# Patient Record
Sex: FEMALE | Race: WHITE
[De-identification: names, ages, dates, MRNs, and addresses within clinical notes are randomized per-mention and may not be internally consistent; named-entity substitution may affect disease eponyms.]

---

## 2019-10-11 ENCOUNTER — HOSPITAL ENCOUNTER (INPATIENT)
Dept: HOSPITAL 61 - ER | Age: 54
LOS: 4 days | Discharge: HOME | DRG: 280 | End: 2019-10-15
Attending: INTERNAL MEDICINE | Admitting: INTERNAL MEDICINE
Payer: MEDICARE

## 2019-10-11 VITALS — BODY MASS INDEX: 40.58 KG/M2 | WEIGHT: 252.5 LBS | HEIGHT: 66 IN

## 2019-10-11 DIAGNOSIS — I27.81: ICD-10-CM

## 2019-10-11 DIAGNOSIS — F20.9: ICD-10-CM

## 2019-10-11 DIAGNOSIS — E66.01: ICD-10-CM

## 2019-10-11 DIAGNOSIS — E78.00: ICD-10-CM

## 2019-10-11 DIAGNOSIS — Z87.891: ICD-10-CM

## 2019-10-11 DIAGNOSIS — J96.01: ICD-10-CM

## 2019-10-11 DIAGNOSIS — Z88.8: ICD-10-CM

## 2019-10-11 DIAGNOSIS — I11.0: ICD-10-CM

## 2019-10-11 DIAGNOSIS — R32: ICD-10-CM

## 2019-10-11 DIAGNOSIS — I21.4: Primary | ICD-10-CM

## 2019-10-11 DIAGNOSIS — F41.9: ICD-10-CM

## 2019-10-11 DIAGNOSIS — G47.00: ICD-10-CM

## 2019-10-11 DIAGNOSIS — E03.9: ICD-10-CM

## 2019-10-11 DIAGNOSIS — F31.9: ICD-10-CM

## 2019-10-11 DIAGNOSIS — Z82.49: ICD-10-CM

## 2019-10-11 DIAGNOSIS — I50.43: ICD-10-CM

## 2019-10-11 LAB
BASOPHILS # BLD AUTO: 0.1 X10^3/UL (ref 0–0.2)
BASOPHILS NFR BLD: 1 % (ref 0–3)
EOSINOPHIL NFR BLD: 0 % (ref 0–3)
EOSINOPHIL NFR BLD: 0 X10^3/UL (ref 0–0.7)
ERYTHROCYTE [DISTWIDTH] IN BLOOD BY AUTOMATED COUNT: 14.7 % (ref 11.5–14.5)
HCT VFR BLD CALC: 40.1 % (ref 36–47)
HGB BLD-MCNC: 13.1 G/DL (ref 12–15.5)
LYMPHOCYTES # BLD: 1.5 X10^3/UL (ref 1–4.8)
LYMPHOCYTES NFR BLD AUTO: 7 % (ref 24–48)
MCH RBC QN AUTO: 29 PG (ref 25–35)
MCHC RBC AUTO-ENTMCNC: 33 G/DL (ref 31–37)
MCV RBC AUTO: 88 FL (ref 79–100)
MONO #: 2.3 X10^3/UL (ref 0–1.1)
MONOCYTES NFR BLD: 11 % (ref 0–9)
NEUT #: 17.4 X10^3/UL (ref 1.8–7.7)
NEUTROPHILS NFR BLD AUTO: 82 % (ref 31–73)
PLATELET # BLD AUTO: 175 X10^3/UL (ref 140–400)
RBC # BLD AUTO: 4.54 X10^6/UL (ref 3.5–5.4)
WBC # BLD AUTO: 21.4 X10^3/UL (ref 4–11)

## 2019-10-11 PROCEDURE — 83690 ASSAY OF LIPASE: CPT

## 2019-10-11 PROCEDURE — C1892 INTRO/SHEATH,FIXED,PEEL-AWAY: HCPCS

## 2019-10-11 PROCEDURE — 83880 ASSAY OF NATRIURETIC PEPTIDE: CPT

## 2019-10-11 PROCEDURE — 99152 MOD SED SAME PHYS/QHP 5/>YRS: CPT

## 2019-10-11 PROCEDURE — 85007 BL SMEAR W/DIFF WBC COUNT: CPT

## 2019-10-11 PROCEDURE — 90471 IMMUNIZATION ADMIN: CPT

## 2019-10-11 PROCEDURE — 84484 ASSAY OF TROPONIN QUANT: CPT

## 2019-10-11 PROCEDURE — 71045 X-RAY EXAM CHEST 1 VIEW: CPT

## 2019-10-11 PROCEDURE — G0378 HOSPITAL OBSERVATION PER HR: HCPCS

## 2019-10-11 PROCEDURE — C1769 GUIDE WIRE: HCPCS

## 2019-10-11 PROCEDURE — 80053 COMPREHEN METABOLIC PANEL: CPT

## 2019-10-11 PROCEDURE — 99153 MOD SED SAME PHYS/QHP EA: CPT

## 2019-10-11 PROCEDURE — 93005 ELECTROCARDIOGRAM TRACING: CPT

## 2019-10-11 PROCEDURE — 93461 R&L HRT ART/VENTRICLE ANGIO: CPT

## 2019-10-11 PROCEDURE — 83735 ASSAY OF MAGNESIUM: CPT

## 2019-10-11 PROCEDURE — 85025 COMPLETE CBC W/AUTO DIFF WBC: CPT

## 2019-10-11 PROCEDURE — 90686 IIV4 VACC NO PRSV 0.5 ML IM: CPT

## 2019-10-11 PROCEDURE — 85347 COAGULATION TIME ACTIVATED: CPT

## 2019-10-11 PROCEDURE — 94760 N-INVAS EAR/PLS OXIMETRY 1: CPT

## 2019-10-11 PROCEDURE — 93970 EXTREMITY STUDY: CPT

## 2019-10-11 PROCEDURE — 85520 HEPARIN ASSAY: CPT

## 2019-10-11 PROCEDURE — 93306 TTE W/DOPPLER COMPLETE: CPT

## 2019-10-11 PROCEDURE — 94640 AIRWAY INHALATION TREATMENT: CPT

## 2019-10-11 PROCEDURE — C1773 RET DEV, INSERTABLE: HCPCS

## 2019-10-11 PROCEDURE — 36415 COLL VENOUS BLD VENIPUNCTURE: CPT

## 2019-10-12 VITALS — SYSTOLIC BLOOD PRESSURE: 103 MMHG | DIASTOLIC BLOOD PRESSURE: 52 MMHG

## 2019-10-12 VITALS — DIASTOLIC BLOOD PRESSURE: 79 MMHG | SYSTOLIC BLOOD PRESSURE: 99 MMHG

## 2019-10-12 VITALS — SYSTOLIC BLOOD PRESSURE: 147 MMHG | DIASTOLIC BLOOD PRESSURE: 94 MMHG

## 2019-10-12 VITALS — DIASTOLIC BLOOD PRESSURE: 82 MMHG | SYSTOLIC BLOOD PRESSURE: 136 MMHG

## 2019-10-12 VITALS — SYSTOLIC BLOOD PRESSURE: 114 MMHG | DIASTOLIC BLOOD PRESSURE: 67 MMHG

## 2019-10-12 VITALS — SYSTOLIC BLOOD PRESSURE: 108 MMHG | DIASTOLIC BLOOD PRESSURE: 74 MMHG

## 2019-10-12 VITALS — SYSTOLIC BLOOD PRESSURE: 143 MMHG | DIASTOLIC BLOOD PRESSURE: 89 MMHG

## 2019-10-12 VITALS — SYSTOLIC BLOOD PRESSURE: 105 MMHG | DIASTOLIC BLOOD PRESSURE: 68 MMHG

## 2019-10-12 VITALS — SYSTOLIC BLOOD PRESSURE: 107 MMHG | DIASTOLIC BLOOD PRESSURE: 69 MMHG

## 2019-10-12 VITALS — SYSTOLIC BLOOD PRESSURE: 104 MMHG | DIASTOLIC BLOOD PRESSURE: 65 MMHG

## 2019-10-12 VITALS — DIASTOLIC BLOOD PRESSURE: 72 MMHG | SYSTOLIC BLOOD PRESSURE: 92 MMHG

## 2019-10-12 VITALS — DIASTOLIC BLOOD PRESSURE: 70 MMHG | SYSTOLIC BLOOD PRESSURE: 116 MMHG

## 2019-10-12 VITALS — SYSTOLIC BLOOD PRESSURE: 127 MMHG | DIASTOLIC BLOOD PRESSURE: 79 MMHG

## 2019-10-12 VITALS — SYSTOLIC BLOOD PRESSURE: 151 MMHG | DIASTOLIC BLOOD PRESSURE: 94 MMHG

## 2019-10-12 VITALS — SYSTOLIC BLOOD PRESSURE: 125 MMHG | DIASTOLIC BLOOD PRESSURE: 75 MMHG

## 2019-10-12 VITALS — DIASTOLIC BLOOD PRESSURE: 75 MMHG | SYSTOLIC BLOOD PRESSURE: 127 MMHG

## 2019-10-12 VITALS — SYSTOLIC BLOOD PRESSURE: 132 MMHG | DIASTOLIC BLOOD PRESSURE: 71 MMHG

## 2019-10-12 VITALS — DIASTOLIC BLOOD PRESSURE: 93 MMHG | SYSTOLIC BLOOD PRESSURE: 145 MMHG

## 2019-10-12 VITALS — SYSTOLIC BLOOD PRESSURE: 126 MMHG | DIASTOLIC BLOOD PRESSURE: 76 MMHG

## 2019-10-12 VITALS — SYSTOLIC BLOOD PRESSURE: 93 MMHG | DIASTOLIC BLOOD PRESSURE: 65 MMHG

## 2019-10-12 VITALS — SYSTOLIC BLOOD PRESSURE: 123 MMHG | DIASTOLIC BLOOD PRESSURE: 74 MMHG

## 2019-10-12 VITALS — SYSTOLIC BLOOD PRESSURE: 135 MMHG | DIASTOLIC BLOOD PRESSURE: 75 MMHG

## 2019-10-12 VITALS — DIASTOLIC BLOOD PRESSURE: 70 MMHG | SYSTOLIC BLOOD PRESSURE: 104 MMHG

## 2019-10-12 VITALS — DIASTOLIC BLOOD PRESSURE: 68 MMHG | SYSTOLIC BLOOD PRESSURE: 116 MMHG

## 2019-10-12 VITALS — SYSTOLIC BLOOD PRESSURE: 127 MMHG | DIASTOLIC BLOOD PRESSURE: 55 MMHG

## 2019-10-12 VITALS — SYSTOLIC BLOOD PRESSURE: 95 MMHG | DIASTOLIC BLOOD PRESSURE: 63 MMHG

## 2019-10-12 VITALS — DIASTOLIC BLOOD PRESSURE: 74 MMHG | SYSTOLIC BLOOD PRESSURE: 107 MMHG

## 2019-10-12 VITALS — SYSTOLIC BLOOD PRESSURE: 99 MMHG | DIASTOLIC BLOOD PRESSURE: 65 MMHG

## 2019-10-12 LAB
% LYMPHS: 6 % (ref 24–48)
% MONOS: 3 % (ref 0–10)
% SEGS: 91 % (ref 35–66)
ALBUMIN SERPL-MCNC: 3.2 G/DL (ref 3.4–5)
ALBUMIN/GLOB SERPL: 0.8 {RATIO} (ref 1–1.7)
ALP SERPL-CCNC: 78 U/L (ref 46–116)
ALT SERPL-CCNC: 24 U/L (ref 14–59)
ANION GAP SERPL CALC-SCNC: 16 MMOL/L (ref 6–14)
ANISOCYTOSIS BLD QL SMEAR: SLIGHT
AST SERPL-CCNC: 47 U/L (ref 15–37)
BILIRUB SERPL-MCNC: 0.4 MG/DL (ref 0.2–1)
BUN SERPL-MCNC: 15 MG/DL (ref 7–20)
BUN/CREAT SERPL: 14 (ref 6–20)
CALCIUM SERPL-MCNC: 9.5 MG/DL (ref 8.5–10.1)
CHLORIDE SERPL-SCNC: 105 MMOL/L (ref 98–107)
CO2 SERPL-SCNC: 22 MMOL/L (ref 21–32)
CREAT SERPL-MCNC: 1.1 MG/DL (ref 0.6–1)
GFR SERPLBLD BASED ON 1.73 SQ M-ARVRAT: 51.8 ML/MIN
GLOBULIN SER-MCNC: 4.1 G/DL (ref 2.2–3.8)
GLUCOSE SERPL-MCNC: 124 MG/DL (ref 70–99)
LIPASE: 66 U/L (ref 73–393)
MAGNESIUM SERPL-MCNC: 2.4 MG/DL (ref 1.8–2.4)
PLATELET # BLD EST: ADEQUATE 10*3/UL
POTASSIUM SERPL-SCNC: 3.7 MMOL/L (ref 3.5–5.1)
PROT SERPL-MCNC: 7.3 G/DL (ref 6.4–8.2)
SODIUM SERPL-SCNC: 143 MMOL/L (ref 136–145)
WBC TOXIC VACUOLES BLD QL SMEAR: SLIGHT

## 2019-10-12 PROCEDURE — B211YZZ FLUOROSCOPY OF MULTIPLE CORONARY ARTERIES USING OTHER CONTRAST: ICD-10-PCS | Performed by: INTERNAL MEDICINE

## 2019-10-12 PROCEDURE — 4A023N8 MEASUREMENT OF CARDIAC SAMPLING AND PRESSURE, BILATERAL, PERCUTANEOUS APPROACH: ICD-10-PCS | Performed by: INTERNAL MEDICINE

## 2019-10-12 PROCEDURE — B215YZZ FLUOROSCOPY OF LEFT HEART USING OTHER CONTRAST: ICD-10-PCS | Performed by: INTERNAL MEDICINE

## 2019-10-12 RX ADMIN — HEPARIN SODIUM AND DEXTROSE PRN MLS/HR: 5000; 5 INJECTION INTRAVENOUS at 02:59

## 2019-10-12 RX ADMIN — METOPROLOL TARTRATE SCH MG: 25 TABLET ORAL at 21:03

## 2019-10-12 RX ADMIN — BACITRACIN SCH MLS/HR: 5000 INJECTION, POWDER, FOR SOLUTION INTRAMUSCULAR at 23:32

## 2019-10-12 RX ADMIN — ASPIRIN SCH MG: 81 TABLET, COATED ORAL at 11:27

## 2019-10-12 RX ADMIN — BACITRACIN SCH MLS/HR: 5000 INJECTION, POWDER, FOR SOLUTION INTRAMUSCULAR at 11:00

## 2019-10-12 RX ADMIN — ATORVASTATIN CALCIUM SCH MG: 40 TABLET, FILM COATED ORAL at 21:03

## 2019-10-12 RX ADMIN — HEPARIN SODIUM PRN UNIT: 1000 INJECTION, SOLUTION INTRAVENOUS; SUBCUTANEOUS at 02:54

## 2019-10-12 RX ADMIN — METOPROLOL TARTRATE SCH MG: 25 TABLET ORAL at 11:27

## 2019-10-12 NOTE — HP
ADMIT DATE:  10/12/2019



CHIEF COMPLAINT:  Shortness of breath.



HISTORY OF PRESENT ILLNESS:  The patient is a pleasant middle-aged white female,

who presented to the ER with shortness of breath.  Interestingly, her troponin

was bumped to 12.2.  She appears to have had a myocardial infarction.  I

discussed the case with ER physician.  We admitted the patient to the ICU with

consultation to Cardiology.



PAST MEDICAL HISTORY:  Anxiety, hypothyroidism, pneumonia, CHF, hypertension, I

think she has seizures, psychiatric issues, insomnia, bipolar, urinary

incontinence.



ALLERGIES:  HALDOL.



FAMILY HISTORY:  Hypertension.



SOCIAL HISTORY:  She does not drink, smoke, or take drugs.



MEDICATIONS:  Reviewed, please refer to the MRAD.



REVIEW OF SYSTEMS:

GENERAL:  No history of weight change, weakness or fevers.

SKIN:  No bruising, hair changes or rashes.

EYES:  No blurred, double or loss of vision.

NOSE AND THROAT:  No history of nosebleeds, hoarseness or sore throat.

HEART:  No history of palpitations, chest pain or shortness of breath on

exertion.

LUNGS:  Denies cough, hemoptysis, wheezing or shortness of breath.

GASTROINTESTINAL:  Denies changes in appetite, nausea, vomiting, diarrhea or

constipation.

GENITOURINARY:  No history of frequency, urgency, hesitancy or nocturia.

NEUROLOGIC:  Denies history of numbness, tingling, tremor or weakness.

PSYCHIATRIC:  No history of panic, anxiety or depression.

ENDOCRINE:  No history of heat or cold intolerance, polyuria or polydipsia.

EXTREMITIES:  Denies muscle weakness, joint pain, pain on walking or stiffness.



PHYSICAL EXAMINATION:

VITALS:  Within normal limits and are stable.

GENERAL:  No apparent distress.  Alert and oriented.

HEENT:  Head is normocephalic, atraumatic, pupils were equally round and

reactive to light and accommodation.

NECK:  Supple, no JVD, no thyromegaly was noted.

LUNGS:  Clear to auscultation in all lung fields without rhonchi or wheezing.

HEART:  RRR, S1, S2 present.  Peripheral pulses intact, no obvious murmurs were

noted.

ABDOMEN:  Soft, nontender.  Positive bowel sounds no organomegaly, normal bowel

sounds.

EXTREMITIES:  Without any cyanosis, clubbing, or edema.  Pedal pulses intact,

Homans sign is negative.

NEUROLOGIC:  Normal speech, normal tone.  A & O x3, moves all extremities, no

obvious focal deficits.

PSYCHIATRIC:  Normal affect, normal mood.  Stable.

SKIN:  No ulcerations or rashes, good skin turgor, no jaundice.

VASCULAR:  Good capillary refill, neurovascular bundle appears to be intact.



LABORATORY DATA:  Troponin is 12.



ASSESSMENT AND PLAN:  Acute myocardial infarction.  The patient has been

admitted.  We are consulting Cardiology.  Serial enzymes, serial EKGs, ICU

monitoring.  Resume home meds, DVT prophylaxis.  Suspect she is going to need a

cardiac cath.  We will await Cardiology input.

 



______________________________

MARCIAL DAVIS DO



DR:  ED/ricky  JOB#:  [id number] / [id number]

DD:  10/12/2019 10:59  DT:  10/12/2019 11:44

## 2019-10-12 NOTE — PDOC2
CARDIOLOGY CONSULT NOTE


CHEIF COMPLAINT:


Chest pain





HPI:


54-year-old woman coming into the hospital in the setting of chest pain and 

shortness of breath. She probably has been having shortness of breath for 3 or 4

days. EMS headache initially activated the catheter lab team due to concern for 

acute ST elevation MI in the early morning hours today. After review there did 

not appear to be any significant ST elevations and she was treated medically. 

This morning she continued to have hypoxia and chest discomfort and therefore 

after discussion the risks and benefits of the procedure with the patient's 

mother due to the patient's cognitive limitations the patient was taken to the 

catheterization laboratory.





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 

stress-induced cardio myopathy of unclear etiology.





PMHX:


Bipolar/schizophrenia


Prior history of tobacco abuse





SOCHX:


Lives in [facility]





FAMHX:


Noncontributory for any cardiac issues





CURRENT MEDS:





Current Medications








 Medications


  (Trade)  Dose


 Ordered  Sig/Jeffry


 Route


 PRN Reason  Start Time


 Stop Time Status Last Admin


Dose Admin


 


 Heparin Sodium/


 Dextrose  500 ml @ 0


 mls/hr  CONT  PRN


 IV


 CARDIOVASCULAR PROTOCOL  10/12/19 02:00


    10/12/19 02:59





 


 Heparin Sodium


  (Porcine)


  (Heparin Sodium)  3,050 unit  PRN Q6HRS  PRN


 IV


 FOR UFH LEVEL LESS THAN 0.2  10/12/19 02:00


    10/12/19 02:54





 


 Sodium Chloride  1,000 ml @ 


 75 mls/hr  [id number]


 IV


   10/12/19 11:00


    10/12/19 11:00





 


 Metoprolol


 Tartrate


  (Lopressor)  25 mg  BID


 PO


   10/12/19 11:15


    10/12/19 11:27





 


 Aspirin


  (Ecotrin)  81 mg  DAILYWBKFT


 PO


   10/12/19 11:15


    10/12/19 11:27





 


 Nitroglycerin


  (Nitroglycerin)  200 mcg  1X  ONCE


 IART


   10/12/19 12:45


 10/12/19 12:50 DC 10/12/19 12:45





 


 Verapamil HCl


  (Verapamil)  2.5 mg  1X  ONCE


 IART


   10/12/19 12:45


 10/12/19 12:50 DC 10/12/19 12:45





 


 Heparin Sodium


  (Porcine)


  (Heparin Sodium)  2,500 unit  1X  ONCE


 IART


   10/12/19 12:45


 10/12/19 12:50 DC 10/12/19 12:45





 


 Heparin Sodium/


 Sodium Chloride


  (HEPARIN for


 ARTERIAL LINE


 FLUSH)  1,000 unit  1X  ONCE


 IART


   10/12/19 12:45


 10/12/19 12:50 DC 10/12/19 12:45





 


 Heparin Sodium/


 Sodium Chloride


  (HEPARIN for


 ARTERIAL LINE


 FLUSH)  1,000 unit  1X  ONCE


 IART


   10/12/19 12:45


 10/12/19 12:50 DC 10/12/19 12:45





 


 Midazolam HCl


  (Versed)  2 mg  1X  ONCE


 IV


   10/12/19 12:45


 10/12/19 12:50 DC 10/12/19 12:45





 


 Fentanyl Citrate


  (Fentanyl 2ml


 Vial)  100 mcg  1X  ONCE


 IV


   10/12/19 12:45


 10/12/19 12:50 DC 10/12/19 12:45





 


 Iodixanol


  (Visipaque 320)  100 ml  1X  ONCE


 IART


   10/12/19 12:45


 10/12/19 12:50 DC 10/12/19 12:45





 


 Lidocaine HCl


  (Xylocaine-Mpf


 1% 2ml Vial)  2 ml  1X  ONCE


 INJ


   10/12/19 12:45


 10/12/19 12:50 DC 10/12/19 12:45














ALLERGIES:





Allergies








Coded Allergies Type Severity Reaction Last Updated Verified


 


  haloperidol Allergy Severe tongue swelling 10/12/19 Yes











ROS:


She has notable dyspnea, chest tenderness and chest pain. She denies any 

associated lower extremity edema, orthopnea or PND. She currently does have some

increasing oxygen requirements. No abdominal pain. No palpitations or syncope.





PHYSICAL EXAM:


Vital Signs/I&O:





                                   Vital Signs








  Date Time  Temp Pulse Resp B/P (MAP) Pulse Ox O2 Delivery O2 Flow Rate FiO2


 


10/12/19 13:29  112 16  96 Nasal Cannula 3.0 


 


10/12/19 11:27    123/85    


 


10/12/19 08:00 98.8       





 98.8       














                                    I & O   


 


 10/11/19 10/11/19 10/12/19





 15:00 23:00 07:00


 


Intake Total   47 ml


 


Output Total   0 ml


 


Balance   47 ml








Physical Exam:


GEN.:    No apparent distress.  Alert and oriented.


HEENT:    Head is normocephalic, atraumatic


NECK:    Supple.  


LUNGS:    Decreased breath sounds at lung bases.


HEART:    Irregularly irregular S1, S2 present.  Peripheral pulses intact


ABDOMEN:    Soft, nontender.  Positive bowel sounds.


EXTREMITIES:    Without any cyanosis.    


NEUROLOGIC:     Normal speech, normal tone


PSYCHIATRIC:    Normal affect, normal mood.


SKIN:   No ulcerations





DIAGNOSTIC TESTING:


Troponin peaked at 12.6


EKG demonstrates nonspecific diffuse ST-T wave changes


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 

of the mid distal LAD and probably the distal PDA.


LV function was moderately diminished at approximately 40%.


Lab





Laboratory Tests








Test


 10/11/19


23:45 10/12/19


09:10


 


White Blood Count


 21.4 x10^3/uL


(4.0-11.0)  H 





 


Red Blood Count


 4.54 x10^6/uL


(3.50-5.40) 





 


Hemoglobin


 13.1 g/dL


(12.0-15.5) 





 


Hematocrit


 40.1 %


(36.0-47.0) 





 


Mean Corpuscular Volume


 88 fL ()


 





 


Mean Corpuscular Hemoglobin 29 pg (25-35)   


 


Mean Corpuscular Hemoglobin


Concent 33 g/dL


(31-37) 





 


Red Cell Distribution Width


 14.7 %


(11.5-14.5)  H 





 


Platelet Count


 175 x10^3/uL


(140-400) 





 


Neutrophils (%) (Auto) 82 % (31-73)  H 


 


Lymphocytes (%) (Auto) 7 % (24-48)  L 


 


Monocytes (%) (Auto) 11 % (0-9)  H 


 


Eosinophils (%) (Auto) 0 % (0-3)   


 


Basophils (%) (Auto) 1 % (0-3)   


 


Neutrophils # (Auto)


 17.4 x10^3/uL


(1.8-7.7)  H 





 


Lymphocytes # (Auto)


 1.5 x10^3/uL


(1.0-4.8) 





 


Monocytes # (Auto)


 2.3 x10^3/uL


(0.0-1.1)  H 





 


Eosinophils # (Auto)


 0.0 x10^3/uL


(0.0-0.7) 





 


Basophils # (Auto)


 0.1 x10^3/uL


(0.0-0.2) 





 


Segmented Neutrophils % 91 % (35-66)  H 


 


Lymphocytes % 6 % (24-48)  L 


 


Monocytes % 3 % (0-10)   


 


Toxic Vacuolation Slight   


 


Platelet Estimate


 Adequate


(ADEQUATE) 





 


Anisocytosis Slight   


 


Sodium Level


 143 mmol/L


(136-145) 





 


Potassium Level


 3.7 mmol/L


(3.5-5.1) 





 


Chloride Level


 105 mmol/L


() 





 


Carbon Dioxide Level


 22 mmol/L


(21-32) 





 


Anion Gap 16 (6-14)  H 


 


Blood Urea Nitrogen


 15 mg/dL


(7-20) 





 


Creatinine


 1.1 mg/dL


(0.6-1.0)  H 





 


Estimated GFR


(Cockcroft-Gault) 51.8  


 





 


BUN/Creatinine Ratio 14 (6-20)   


 


Glucose Level


 124 mg/dL


(70-99)  H 





 


Calcium Level


 9.5 mg/dL


(8.5-10.1) 





 


Total Bilirubin


 0.4 mg/dL


(0.2-1.0) 





 


Aspartate Amino Transf


(AST/SGOT) 47 U/L (15-37)


H 





 


Alkaline Phosphatase


 78 U/L


() 





 


Total Protein


 7.3 g/dL


(6.4-8.2) 





 


Albumin


 3.2 g/dL


(3.4-5.0)  L 





 


Albumin/Globulin Ratio


 0.8 (1.0-1.7)


L 





 


Lipase


 66 U/L


()  L 





 


Heparin Anti-Xa Act,


Unfractionated 


 < 0.10 IU/mL


(0.30-0.70)  L





                                Laboratory Tests


10/11/19 23:45











ASSESSMENT:


1. Acute hypoxic respiratory failure


2. 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 

artery dissection


3. Prior history of tobacco abuse


4. Cannot rule out stress-induced cardio myopathy.


5. Cor pulmonale based on right heart catheterization with a right atrial 

pressure 14, mean PA pressure of 31, normal LVEDP.





PLAN:


1. Discussed with the patient's family in great detail. At this present time she

does not appear to be in cardiac shock and we will continue intravenous and 

coagulation. There is a small possibility based on her right heart catheter that

this could've been precipitated by pulmonary embolus although the treatment 

management would be the same at this time. Would defer any CT angiography unless

she has any clinical deterioration. Check lower extremity venous Dopplers.


Continue asa, statin, metoprolol and hep gtt for now.











LETICIA RODRIGUEZ MD       Oct 12, 2019 14:03

## 2019-10-12 NOTE — NUR
paged Dr. Rodriguez to confirm Heparin gtt is still to be running as it was not restarted 
following cath lab. Dr. Rodriguez requested Heparin gtt be restarted at initial rate.

## 2019-10-12 NOTE — NUR
Pt arrived on unit via bed accompanied by ED RN. Pt oriented to unit including call light, 
bathroom use, and privacy policies. Pt c/o chest discomfort and stated it has not changed 
since her time in the ED. Pt accompanied by family and admission questions answered by pt's 
mother. Will continue to monitor

## 2019-10-12 NOTE — RAD
Study: PORTABLE CHEST 1V

 

Indication: Shortness of breath.

 

Comparison: 5/19/2016

 

Findings:

 

The cardiomediastinal silhouette is enlarged. The central vascular 

structures are prominent but to a similar degree relative to the prior.

 

Blunting of the left costophrenic angle suggesting a pleural effusion. 

Left lower lung volume loss is also likely present. No pneumothorax. No 

discrete lobar infiltrate however there is haziness at the retrocardiac 

left lung medially.

 

No free air seen under the diaphragm.

 

Impression:

1. Enlargement of the cardiomediastinal silhouette as well as central 

vascular prominence. This appearance is similar to the prior. A component 

of pulmonary edema is likely present as there is the suggestion of a small

left pleural effusion.

2. Basilar volume loss as well as hazy increased attenuation at the medial

aspect of the left lower lung. Though this appearance is not overtly 

concerning, consider correlation for any laboratory findings that would 

suggest pneumonia.

 

Electronically signed by: JANESSA MOFFETT MD (10/12/2019 6:05 AM) 

[facility]

## 2019-10-12 NOTE — PHYS DOC
Past Medical History


Past Medical History:  Anxiety, High Cholesterol, Hypothyroid, Pneumonia, Other


Additional Past Medical Histor:  unspecified heart failure


Past Surgical History:  Other


Additional Past Surgical Histo:  unknown


Alcohol Use:  None


Drug Use:  None





Adult General


Chief Complaint


Chief Complaint:  SHORTNESS OF BREATH





HPI


HPI





Patient is a 54-year-old female who arrives via EMS with STEMI alert by EMS. EMS

responded for report of shortness of breath with low oxygen saturation and 

complaint of generalized weakness for the last few days. Patient denies any 

chest pain, nausea, vomiting or diaphoresis. She does admit to feeling some 

shortness of breath.[]





Review of Systems


Review of Systems





Constitutional: Denies fever or chills []


Respiratory: Complains of shortness of breath []


Cardiovascular: No additional information not addressed in HPI []


GI: Denies abdominal pain, nausea, vomiting or diarrhea []


Integument: Denies rash or skin lesions []


Neurologic: Denies headache, focal weakness or sensory changes []





All other systems were reviewed and found to be within normal limits, except as 

documented in this note.





Current Medications


Current Medications





Current Medications








 Medications


  (Trade)  Dose


 Ordered  Sig/Jeffry  Start Time


 Stop Time Status Last Admin


Dose Admin


 


 Atorvastatin


 Calcium


  (Lipitor)  40 mg  QHS  10/12/19 21:00


     





 


 Heparin Sodium


  (Porcine)


  (Heparin Sodium)  2,000 unit  PRN Q6HRS  PRN  10/12/19 00:45


     





 


 Heparin Sodium/


 Dextrose  500 ml @ 0


 mls/hr  CONT PRN  PRN  10/12/19 00:45


     














Allergies


Allergies





Allergies








Coded Allergies Type Severity Reaction Last Updated Verified


 


  No Known Drug Allergies    3/6/16 No











Physical Exam


Physical Exam





Constitutional: Well developed, well nourished, no acute distress, non-toxic 

appearance. []


HENT: Normocephalic, atraumatic, bilateral external ears normal, oropharynx 

moist, no oral exudates, nose normal. []


Eyes: PERRLA, EOMI, conjunctiva normal, no discharge. [] 


Neck: Normal range of motion, no tenderness, supple. [] 


Cardiovascular: Regular rate and rhythm[]


Lungs & Thorax: Very good air movement is noted throughout with fine rhonchi to 

auscultation []


Abdomen: Bowel sounds normal, soft, morbidly obese, no tenderness. [] 


Skin: Warm, dry, no erythema, no rash. [] 


Extremities: No tenderness, no cyanosis, no clubbing, ROM intact. [] 


Neurologic: Alert and oriented X 3, no focal deficits noted. []





Current Patient Data


Lab Values





                                Laboratory Tests








Test


 10/11/19


23:45


 


White Blood Count


 21.4 x10^3/uL


(4.0-11.0)  H


 


Red Blood Count


 4.54 x10^6/uL


(3.50-5.40)


 


Hemoglobin


 13.1 g/dL


(12.0-15.5)


 


Hematocrit


 40.1 %


(36.0-47.0)


 


Mean Corpuscular Volume


 88 fL ()





 


Mean Corpuscular Hemoglobin 29 pg (25-35)  


 


Mean Corpuscular Hemoglobin


Concent 33 g/dL


(31-37)


 


Red Cell Distribution Width


 14.7 %


(11.5-14.5)  H


 


Platelet Count


 175 x10^3/uL


(140-400)


 


Neutrophils (%) (Auto) 82 % (31-73)  H


 


Lymphocytes (%) (Auto) 7 % (24-48)  L


 


Monocytes (%) (Auto) 11 % (0-9)  H


 


Eosinophils (%) (Auto) 0 % (0-3)  


 


Basophils (%) (Auto) 1 % (0-3)  


 


Neutrophils # (Auto)


 17.4 x10^3/uL


(1.8-7.7)  H


 


Lymphocytes # (Auto)


 1.5 x10^3/uL


(1.0-4.8)


 


Monocytes # (Auto)


 2.3 x10^3/uL


(0.0-1.1)  H


 


Eosinophils # (Auto)


 0.0 x10^3/uL


(0.0-0.7)


 


Basophils # (Auto)


 0.1 x10^3/uL


(0.0-0.2)


 


Segmented Neutrophils % 91 % (35-66)  H


 


Lymphocytes % 6 % (24-48)  L


 


Monocytes % 3 % (0-10)  


 


Toxic Vacuolation Slight  


 


Platelet Estimate


 Adequate


(ADEQUATE)


 


Anisocytosis Slight  


 


Sodium Level


 143 mmol/L


(136-145)


 


Potassium Level


 3.7 mmol/L


(3.5-5.1)


 


Chloride Level


 105 mmol/L


()


 


Carbon Dioxide Level


 22 mmol/L


(21-32)


 


Anion Gap 16 (6-14)  H


 


Blood Urea Nitrogen


 15 mg/dL


(7-20)


 


Creatinine


 1.1 mg/dL


(0.6-1.0)  H


 


Estimated GFR


(Cockcroft-Gault) 51.8  





 


BUN/Creatinine Ratio 14 (6-20)  


 


Glucose Level


 124 mg/dL


(70-99)  H


 


Calcium Level


 9.5 mg/dL


(8.5-10.1)


 


Magnesium Level


 2.4 mg/dL


(1.8-2.4)


 


Total Bilirubin


 0.4 mg/dL


(0.2-1.0)


 


Aspartate Amino Transferase


(AST) 47 U/L (15-37)


H


 


Alanine Aminotransferase (ALT)


 24 U/L (14-59)





 


Alkaline Phosphatase


 78 U/L


()


 


Troponin I Quantitative


 12.244 ng/mL


(0.000-0.055)


 


NT-Pro-B-Type Natriuretic


Peptide 3772 pg/mL


(0-124)  H


 


Total Protein


 7.3 g/dL


(6.4-8.2)


 


Albumin


 3.2 g/dL


(3.4-5.0)  L


 


Albumin/Globulin Ratio


 0.8 (1.0-1.7)


L


 


Lipase


 66 U/L


()  L





                                Laboratory Tests


10/11/19 23:45








                                Laboratory Tests


10/11/19 23:45














EKG


EKG


[]


Interpretation Time:


EKG demonstrates sinus rhythm with rate of 95.





Radiology/Procedures


Radiology/Procedures


[]





Course & Med Decision Making


Course & Med Decision Making


Pertinent Labs and Imaging studies reviewed. (See chart for details)





[]





Dragon Disclaimer


Dragon Disclaimer


This electronic medical record was generated, in whole or in part, using a voice

 recognition dictation system.





Departure


Departure


Impression:  


   Primary Impression:  


   NSTEMI (non-ST elevated myocardial infarction)


Disposition:  09 ADMITTED AS INPATIENT


Admitting Physician:  DM (Dr. Galvan)


Condition:  IMPROVED


Referrals:  


SHANE FLETCHER (PCP)











MARCI WONG Jr. DO          Oct 12, 2019 00:34

## 2019-10-13 VITALS — DIASTOLIC BLOOD PRESSURE: 59 MMHG | SYSTOLIC BLOOD PRESSURE: 111 MMHG

## 2019-10-13 VITALS — SYSTOLIC BLOOD PRESSURE: 117 MMHG | DIASTOLIC BLOOD PRESSURE: 68 MMHG

## 2019-10-13 VITALS — SYSTOLIC BLOOD PRESSURE: 110 MMHG | DIASTOLIC BLOOD PRESSURE: 73 MMHG

## 2019-10-13 VITALS — DIASTOLIC BLOOD PRESSURE: 62 MMHG | SYSTOLIC BLOOD PRESSURE: 97 MMHG

## 2019-10-13 VITALS — SYSTOLIC BLOOD PRESSURE: 111 MMHG | DIASTOLIC BLOOD PRESSURE: 60 MMHG

## 2019-10-13 VITALS — DIASTOLIC BLOOD PRESSURE: 51 MMHG | SYSTOLIC BLOOD PRESSURE: 100 MMHG

## 2019-10-13 VITALS — DIASTOLIC BLOOD PRESSURE: 59 MMHG | SYSTOLIC BLOOD PRESSURE: 104 MMHG

## 2019-10-13 VITALS — SYSTOLIC BLOOD PRESSURE: 125 MMHG | DIASTOLIC BLOOD PRESSURE: 75 MMHG

## 2019-10-13 VITALS — DIASTOLIC BLOOD PRESSURE: 58 MMHG | SYSTOLIC BLOOD PRESSURE: 91 MMHG

## 2019-10-13 VITALS — DIASTOLIC BLOOD PRESSURE: 60 MMHG | SYSTOLIC BLOOD PRESSURE: 110 MMHG

## 2019-10-13 VITALS — DIASTOLIC BLOOD PRESSURE: 64 MMHG | SYSTOLIC BLOOD PRESSURE: 117 MMHG

## 2019-10-13 VITALS — SYSTOLIC BLOOD PRESSURE: 95 MMHG | DIASTOLIC BLOOD PRESSURE: 57 MMHG

## 2019-10-13 VITALS — SYSTOLIC BLOOD PRESSURE: 110 MMHG | DIASTOLIC BLOOD PRESSURE: 68 MMHG

## 2019-10-13 VITALS — SYSTOLIC BLOOD PRESSURE: 110 MMHG | DIASTOLIC BLOOD PRESSURE: 59 MMHG

## 2019-10-13 VITALS — SYSTOLIC BLOOD PRESSURE: 150 MMHG | DIASTOLIC BLOOD PRESSURE: 92 MMHG

## 2019-10-13 LAB
ALBUMIN SERPL-MCNC: 2.5 G/DL (ref 3.4–5)
ALBUMIN/GLOB SERPL: 0.6 {RATIO} (ref 1–1.7)
ALP SERPL-CCNC: 71 U/L (ref 46–116)
ALT SERPL-CCNC: 34 U/L (ref 14–59)
ANION GAP SERPL CALC-SCNC: 9 MMOL/L (ref 6–14)
AST SERPL-CCNC: 37 U/L (ref 15–37)
BASOPHILS # BLD AUTO: 0.1 X10^3/UL (ref 0–0.2)
BASOPHILS NFR BLD: 1 % (ref 0–3)
BILIRUB SERPL-MCNC: 0.3 MG/DL (ref 0.2–1)
BUN SERPL-MCNC: 10 MG/DL (ref 7–20)
BUN/CREAT SERPL: 11 (ref 6–20)
CALCIUM SERPL-MCNC: 9 MG/DL (ref 8.5–10.1)
CHLORIDE SERPL-SCNC: 105 MMOL/L (ref 98–107)
CO2 SERPL-SCNC: 28 MMOL/L (ref 21–32)
CREAT SERPL-MCNC: 0.9 MG/DL (ref 0.6–1)
EOSINOPHIL NFR BLD: 0.1 X10^3/UL (ref 0–0.7)
EOSINOPHIL NFR BLD: 1 % (ref 0–3)
ERYTHROCYTE [DISTWIDTH] IN BLOOD BY AUTOMATED COUNT: 15 % (ref 11.5–14.5)
GFR SERPLBLD BASED ON 1.73 SQ M-ARVRAT: 65.2 ML/MIN
GLOBULIN SER-MCNC: 4.2 G/DL (ref 2.2–3.8)
GLUCOSE SERPL-MCNC: 104 MG/DL (ref 70–99)
HCT VFR BLD CALC: 35.1 % (ref 36–47)
HGB BLD-MCNC: 11.4 G/DL (ref 12–15.5)
LYMPHOCYTES # BLD: 1.8 X10^3/UL (ref 1–4.8)
LYMPHOCYTES NFR BLD AUTO: 13 % (ref 24–48)
MCH RBC QN AUTO: 28 PG (ref 25–35)
MCHC RBC AUTO-ENTMCNC: 33 G/DL (ref 31–37)
MCV RBC AUTO: 87 FL (ref 79–100)
MONO #: 1.3 X10^3/UL (ref 0–1.1)
MONOCYTES NFR BLD: 9 % (ref 0–9)
NEUT #: 10.6 X10^3/UL (ref 1.8–7.7)
NEUTROPHILS NFR BLD AUTO: 77 % (ref 31–73)
PLATELET # BLD AUTO: 191 X10^3/UL (ref 140–400)
POTASSIUM SERPL-SCNC: 3.8 MMOL/L (ref 3.5–5.1)
PROT SERPL-MCNC: 6.7 G/DL (ref 6.4–8.2)
RBC # BLD AUTO: 4.04 X10^6/UL (ref 3.5–5.4)
SODIUM SERPL-SCNC: 142 MMOL/L (ref 136–145)
WBC # BLD AUTO: 13.9 X10^3/UL (ref 4–11)

## 2019-10-13 RX ADMIN — ATORVASTATIN CALCIUM SCH MG: 40 TABLET, FILM COATED ORAL at 20:59

## 2019-10-13 RX ADMIN — BACITRACIN SCH MLS/HR: 5000 INJECTION, POWDER, FOR SOLUTION INTRAMUSCULAR at 12:37

## 2019-10-13 RX ADMIN — QUETIAPINE FUMARATE SCH MG: 100 TABLET ORAL at 21:01

## 2019-10-13 RX ADMIN — ZOLPIDEM TARTRATE SCH MG: 5 TABLET ORAL at 20:59

## 2019-10-13 RX ADMIN — IPRATROPIUM BROMIDE AND ALBUTEROL SULFATE SCH ML: .5; 3 SOLUTION RESPIRATORY (INHALATION) at 13:00

## 2019-10-13 RX ADMIN — IPRATROPIUM BROMIDE AND ALBUTEROL SULFATE SCH ML: .5; 3 SOLUTION RESPIRATORY (INHALATION) at 15:27

## 2019-10-13 RX ADMIN — ASPIRIN SCH MG: 81 TABLET, COATED ORAL at 11:10

## 2019-10-13 RX ADMIN — LEVOTHYROXINE SODIUM SCH MCG: 125 TABLET ORAL at 12:29

## 2019-10-13 RX ADMIN — HEPARIN SODIUM PRN UNIT: 1000 INJECTION, SOLUTION INTRAVENOUS; SUBCUTANEOUS at 04:17

## 2019-10-13 RX ADMIN — Medication SCH APP: at 20:59

## 2019-10-13 RX ADMIN — OXYBUTYNIN CHLORIDE SCH MG: 5 TABLET ORAL at 21:00

## 2019-10-13 RX ADMIN — DIVALPROEX SODIUM SCH MG: 250 TABLET, EXTENDED RELEASE ORAL at 13:37

## 2019-10-13 RX ADMIN — HEPARIN SODIUM PRN UNIT: 1000 INJECTION, SOLUTION INTRAVENOUS; SUBCUTANEOUS at 12:50

## 2019-10-13 RX ADMIN — METOPROLOL TARTRATE SCH MG: 25 TABLET ORAL at 21:00

## 2019-10-13 RX ADMIN — METOPROLOL TARTRATE SCH MG: 25 TABLET ORAL at 11:10

## 2019-10-13 RX ADMIN — BACITRACIN SCH MLS/HR: 5000 INJECTION, POWDER, FOR SOLUTION INTRAMUSCULAR at 23:12

## 2019-10-13 RX ADMIN — HEPARIN SODIUM AND DEXTROSE PRN MLS/HR: 5000; 5 INJECTION INTRAVENOUS at 10:40

## 2019-10-13 RX ADMIN — IPRATROPIUM BROMIDE AND ALBUTEROL SULFATE SCH ML: .5; 3 SOLUTION RESPIRATORY (INHALATION) at 20:11

## 2019-10-13 RX ADMIN — LITHIUM CARBONATE SCH MG: 300 TABLET, FILM COATED, EXTENDED RELEASE ORAL at 21:01

## 2019-10-13 NOTE — PDOC
CARDIOLOGY PROGRESS NOTE


SUBJECTIVE:


No acute issues overnight. 


Still needing O2. 


No chest pain. 


Family feels breathing is better.





OBJECTIVE:


Vital Signs/I&O:





                                   Vital Signs








  Date Time  Temp Pulse Resp B/P (MAP) Pulse Ox O2 Delivery O2 Flow Rate FiO2


 


10/13/19 10:00  82 26 110/68 (82) 95 Nasal Cannula 2.0 


 


10/13/19 07:00 98.3       





 98.3       














                                    I & O   


 


 10/12/19 10/12/19 10/13/19





 14:59 22:59 06:59


 


Intake Total 200 ml 683 ml 1596 ml


 


Output Total 850 ml 1 ml 1000 ml


 


Balance -650 ml 682 ml 596 ml








Objective:


GEN.:    No apparent distress.  Alert and oriented.


HEENT:    Head is normocephalic, atraumatic


NECK:    Supple.  


LUNGS:    Clear to auscultation.


HEART:    RRR, S1, S2 present.  Peripheral pulses intact


ABDOMEN:    Soft, nontender.  Positive bowel sounds.


EXTREMITIES:    Without any cyanosis.    


NEUROLOGIC:     Normal speech, normal tone


PSYCHIATRIC:    Normal affect, normal mood.


SKIN:   No ulcerations





CURRENT MEDICATIONS:





Current Medications








 Medications


  (Trade)  Dose


 Ordered  Sig/Jeffry


 Route


 PRN Reason  Start Time


 Stop Time Status Last Admin


Dose Admin


 


 Atorvastatin


 Calcium


  (Lipitor)  40 mg  QHS


 PO


   10/12/19 21:00


    10/12/19 21:03





 


 Sodium Chloride  1,000 ml @ 


 75 mls/hr  [id number]


 IV


   10/12/19 11:00


    10/12/19 23:32





 


 Metoprolol


 Tartrate


  (Lopressor)  25 mg  BID


 PO


   10/12/19 11:15


    10/12/19 21:03





 


 Aspirin


  (Ecotrin)  81 mg  DAILYWBKFT


 PO


   10/12/19 11:15


    10/12/19 11:27





 


 Nitroglycerin


  (Nitroglycerin)  200 mcg  1X  ONCE


 IART


   10/12/19 12:45


 10/12/19 12:50 DC 10/12/19 12:45





 


 Verapamil HCl


  (Verapamil)  2.5 mg  1X  ONCE


 IART


   10/12/19 12:45


 10/12/19 12:50 DC 10/12/19 12:45





 


 Heparin Sodium


  (Porcine)


  (Heparin Sodium)  2,500 unit  1X  ONCE


 IART


   10/12/19 12:45


 10/12/19 12:50 DC 10/12/19 12:45





 


 Heparin Sodium/


 Sodium Chloride


  (HEPARIN for


 ARTERIAL LINE


 FLUSH)  1,000 unit  1X  ONCE


 IART


   10/12/19 12:45


 10/12/19 12:50 DC 10/12/19 12:45





 


 Heparin Sodium/


 Sodium Chloride


  (HEPARIN for


 ARTERIAL LINE


 FLUSH)  1,000 unit  1X  ONCE


 IART


   10/12/19 12:45


 10/12/19 12:50 DC 10/12/19 12:45





 


 Midazolam HCl


  (Versed)  2 mg  1X  ONCE


 IV


   10/12/19 12:45


 10/12/19 12:50 DC 10/12/19 12:45





 


 Fentanyl Citrate


  (Fentanyl 2ml


 Vial)  100 mcg  1X  ONCE


 IV


   10/12/19 12:45


 10/12/19 12:50 DC 10/12/19 12:45





 


 Iodixanol


  (Visipaque 320)  100 ml  1X  ONCE


 IART


   10/12/19 12:45


 10/12/19 12:50 DC 10/12/19 12:45





 


 Lidocaine HCl


  (Xylocaine-Mpf


 1% 2ml Vial)  2 ml  1X  ONCE


 INJ


   10/12/19 12:45


 10/12/19 12:50 DC 10/12/19 12:45














DIAGNOSTIC TESTING:


labs reviewed.


Labs:


                                Laboratory Tests


10/13/19 03:30











Laboratory Tests








Test


 10/13/19


03:30


 


White Blood Count


 13.9 x10^3/uL


(4.0-11.0)  H


 


Red Blood Count


 4.04 x10^6/uL


(3.50-5.40)


 


Hemoglobin


 11.4 g/dL


(12.0-15.5)  L


 


Hematocrit


 35.1 %


(36.0-47.0)  L


 


Mean Corpuscular Volume


 87 fL ()





 


Mean Corpuscular Hemoglobin 28 pg (25-35)  


 


Mean Corpuscular Hemoglobin


Concent 33 g/dL


(31-37)


 


Red Cell Distribution Width


 15.0 %


(11.5-14.5)  H


 


Platelet Count


 191 x10^3/uL


(140-400)


 


Neutrophils (%) (Auto) 77 % (31-73)  H


 


Lymphocytes (%) (Auto) 13 % (24-48)  L


 


Monocytes (%) (Auto) 9 % (0-9)  


 


Eosinophils (%) (Auto) 1 % (0-3)  


 


Basophils (%) (Auto) 1 % (0-3)  


 


Neutrophils # (Auto)


 10.6 x10^3/uL


(1.8-7.7)  H


 


Lymphocytes # (Auto)


 1.8 x10^3/uL


(1.0-4.8)


 


Monocytes # (Auto)


 1.3 x10^3/uL


(0.0-1.1)  H


 


Eosinophils # (Auto)


 0.1 x10^3/uL


(0.0-0.7)


 


Basophils # (Auto)


 0.1 x10^3/uL


(0.0-0.2)


 


Heparin Anti-Xa Act,


Unfractionated < 0.10 IU/mL


(0.30-0.70)  L


 


Sodium Level


 142 mmol/L


(136-145)


 


Potassium Level


 3.8 mmol/L


(3.5-5.1)


 


Chloride Level


 105 mmol/L


()


 


Carbon Dioxide Level


 28 mmol/L


(21-32)


 


Anion Gap 9 (6-14)  


 


Blood Urea Nitrogen


 10 mg/dL


(7-20)


 


Creatinine


 0.9 mg/dL


(0.6-1.0)


 


Estimated GFR


(Cockcroft-Gault) 65.2  





 


BUN/Creatinine Ratio 11 (6-20)  


 


Glucose Level


 104 mg/dL


(70-99)  H


 


Calcium Level


 9.0 mg/dL


(8.5-10.1)


 


Total Bilirubin


 0.3 mg/dL


(0.2-1.0)


 


Aspartate Amino Transf


(AST/SGOT) 37 U/L (15-37)





 


Alkaline Phosphatase


 71 U/L


()


 


Total Protein


 6.7 g/dL


(6.4-8.2)


 


Albumin


 2.5 g/dL


(3.4-5.0)  L


 


Albumin/Globulin Ratio


 0.6 (1.0-1.7)


L











ASSESSMENT:


1. 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


2. HTN


3. Acute on chronic diastolic/systolic HF and possible cor pulmonale.





PLAN:


1. Continue heparin gtt for 12 hours, stop there after, troponin now 

downtrending


2. Start plavix. 


3. Continue asa, b-blocker. 


4. LE DVT scan negative, lower suspicion for P.E. Consider CTA tomorrow if no 

changes in symptoms/hypoxia. 





Discussed with nursing and family. Supportive care.











LETICIA RODRIGUEZ MD       Oct 13, 2019 10:45

## 2019-10-13 NOTE — PDOC
[facility] PROGRESS NOTE


Chief Complaint


Chief Complaint


Chest pain





History of Present Illness


History of Present Illness


10/13/19


Pt seen and examined 


Pt had an NSTEMI and was in the cath lab yesterday with likely coronary artery 

dissection 


Troponin peak 12.2


Pt was quite talkative today and seems to be at her baseline 


DW RN and pt's mother





Vitals/I&O


Vitals/I&O:





                                   Vital Signs








  Date Time  Temp Pulse Resp B/P (MAP) Pulse Ox O2 Delivery O2 Flow Rate FiO2


 


10/13/19 09:00  73 30 104/59 (74) 94 Nasal Cannula 2.0 


 


10/13/19 07:00 98.3       





 98.3       














                                    I & O   


 


 10/12/19 10/12/19 10/13/19





 15:00 23:00 07:00


 


Intake Total 200 ml 683 ml 1596 ml


 


Output Total 850 ml 1 ml 1000 ml


 


Balance -650 ml 682 ml 596 ml











Physical Exam


General:  Alert, No acute distress


Heart:  Regular rate


Lungs:  Clear, Other


Abdomen:  Normal bowel sounds


Extremities:  No clubbing, No cyanosis


Skin:  No rashes





Labs


Labs:





Laboratory Tests








Test


 10/13/19


03:30


 


White Blood Count


 13.9 x10^3/uL


(4.0-11.0)


 


Red Blood Count


 4.04 x10^6/uL


(3.50-5.40)


 


Hemoglobin


 11.4 g/dL


(12.0-15.5)


 


Hematocrit


 35.1 %


(36.0-47.0)


 


Mean Corpuscular Volume 87 fL () 


 


Mean Corpuscular Hemoglobin 28 pg (25-35) 


 


Mean Corpuscular Hemoglobin


Concent 33 g/dL


(31-37)


 


Red Cell Distribution Width


 15.0 %


(11.5-14.5)


 


Platelet Count


 191 x10^3/uL


(140-400)


 


Neutrophils (%) (Auto) 77 % (31-73) 


 


Lymphocytes (%) (Auto) 13 % (24-48) 


 


Monocytes (%) (Auto) 9 % (0-9) 


 


Eosinophils (%) (Auto) 1 % (0-3) 


 


Basophils (%) (Auto) 1 % (0-3) 


 


Neutrophils # (Auto)


 10.6 x10^3/uL


(1.8-7.7)


 


Lymphocytes # (Auto)


 1.8 x10^3/uL


(1.0-4.8)


 


Monocytes # (Auto)


 1.3 x10^3/uL


(0.0-1.1)


 


Eosinophils # (Auto)


 0.1 x10^3/uL


(0.0-0.7)


 


Basophils # (Auto)


 0.1 x10^3/uL


(0.0-0.2)


 


Heparin Anti-Xa Act,


Unfractionated < 0.10 IU/mL


(0.30-0.70)


 


Sodium Level


 142 mmol/L


(136-145)


 


Potassium Level


 3.8 mmol/L


(3.5-5.1)


 


Chloride Level


 105 mmol/L


()


 


Carbon Dioxide Level


 28 mmol/L


(21-32)


 


Anion Gap 9 (6-14) 


 


Blood Urea Nitrogen


 10 mg/dL


(7-20)


 


Creatinine


 0.9 mg/dL


(0.6-1.0)


 


Estimated GFR


(Cockcroft-Gault) 65.2 





 


BUN/Creatinine Ratio 11 (6-20) 


 


Glucose Level


 104 mg/dL


(70-99)


 


Calcium Level


 9.0 mg/dL


(8.5-10.1)


 


Total Bilirubin


 0.3 mg/dL


(0.2-1.0)


 


Aspartate Amino Transf


(AST/SGOT) 37 U/L (15-37) 





 


Alanine Aminotransferase


(ALT/SGPT) 34 U/L (14-59) 





 


Alkaline Phosphatase


 71 U/L


()


 


Total Protein


 6.7 g/dL


(6.4-8.2)


 


Albumin


 2.5 g/dL


(3.4-5.0)


 


Albumin/Globulin Ratio 0.6 (1.0-1.7) 











Review of Systems


Review of Systems:


Denies n/v/d


Denies SOA





Assessment and Plan


Assessmemt and Plan


Problems


Medical Problems:


(1) NSTEMI (non-ST elevated myocardial infarction)


Status: Acute  





Assessment


NSTEMI


Bipolar 


Schizophrenia


Cor pulmonale 


POD day 1 cath lab





Plan 


Cardiac monitoring 


Wound care 


Appreciate cardiology input 


Serial EKGs


Home meds


DVT prophylaxis


Full code





Comment


Review of Relevant


I have reviewed the following items wanda (where applicable) has been applied.


Medications:





Current Medications








 Medications


  (Trade)  Dose


 Ordered  Sig/Jeffry


 Route


 PRN Reason  Start Time


 Stop Time Status Last Admin


Dose Admin


 


 Atorvastatin


 Calcium


  (Lipitor)  40 mg  QHS


 PO


   10/12/19 21:00


    10/12/19 21:03





 


 Sodium Chloride  1,000 ml @ 


 75 mls/hr  [id number]


 IV


   10/12/19 11:00


    10/12/19 23:32





 


 Metoprolol


 Tartrate


  (Lopressor)  25 mg  BID


 PO


   10/12/19 11:15


    10/12/19 21:03





 


 Aspirin


  (Ecotrin)  81 mg  DAILYWBKFT


 PO


   10/12/19 11:15


    10/12/19 11:27





 


 Nitroglycerin


  (Nitroglycerin)  200 mcg  1X  ONCE


 IART


   10/12/19 12:45


 10/12/19 12:50 DC 10/12/19 12:45





 


 Verapamil HCl


  (Verapamil)  2.5 mg  1X  ONCE


 IART


   10/12/19 12:45


 10/12/19 12:50 DC 10/12/19 12:45





 


 Heparin Sodium


  (Porcine)


  (Heparin Sodium)  2,500 unit  1X  ONCE


 IART


   10/12/19 12:45


 10/12/19 12:50 DC 10/12/19 12:45





 


 Heparin Sodium/


 Sodium Chloride


  (HEPARIN for


 ARTERIAL LINE


 FLUSH)  1,000 unit  1X  ONCE


 IART


   10/12/19 12:45


 10/12/19 12:50 DC 10/12/19 12:45





 


 Heparin Sodium/


 Sodium Chloride


  (HEPARIN for


 ARTERIAL LINE


 FLUSH)  1,000 unit  1X  ONCE


 IART


   10/12/19 12:45


 10/12/19 12:50 DC 10/12/19 12:45





 


 Midazolam HCl


  (Versed)  2 mg  1X  ONCE


 IV


   10/12/19 12:45


 10/12/19 12:50 DC 10/12/19 12:45





 


 Fentanyl Citrate


  (Fentanyl 2ml


 Vial)  100 mcg  1X  ONCE


 IV


   10/12/19 12:45


 10/12/19 12:50 DC 10/12/19 12:45





 


 Iodixanol


  (Visipaque 320)  100 ml  1X  ONCE


 IART


   10/12/19 12:45


 10/12/19 12:50 DC 10/12/19 12:45





 


 Lidocaine HCl


  (Xylocaine-Mpf


 1% 2ml Vial)  2 ml  1X  ONCE


 INJ


   10/12/19 12:45


 10/12/19 12:50 DC 10/12/19 12:45




















MARCIAL DAVIS III DO           Oct 13, 2019 10:06

## 2019-10-13 NOTE — EKG
[facility]

              [address] [place] [address]

Test Date:    2019-10-11               Test Time:    23:15:17

Pat Name:     AARON DAI            Department:   

Patient ID:   [id number]           Room:         208 1

Gender:       F                        Technician:   

:          1965               Requested By: MARCI WONG

Order Number: [id number]           Reading MD:   Amish Rodriguez MD

                                 Measurements

Intervals                              Axis          

Rate:         95                       P:            26

[place]:           140                      QRS:          38

QRSD:         84                       T:            15

QT:           320                                    

QTc:          405                                    

                           Interpretive Statements

SINUS RHYTHM

CONSIDER INFEROLATERAL ISCHEMIA

Electronically Signed On 10- 8:55:23 CDT by Amish Rodriguez MD

## 2019-10-14 VITALS — DIASTOLIC BLOOD PRESSURE: 73 MMHG | SYSTOLIC BLOOD PRESSURE: 134 MMHG

## 2019-10-14 VITALS — DIASTOLIC BLOOD PRESSURE: 71 MMHG | SYSTOLIC BLOOD PRESSURE: 135 MMHG

## 2019-10-14 VITALS — DIASTOLIC BLOOD PRESSURE: 67 MMHG | SYSTOLIC BLOOD PRESSURE: 111 MMHG

## 2019-10-14 VITALS — SYSTOLIC BLOOD PRESSURE: 171 MMHG | DIASTOLIC BLOOD PRESSURE: 72 MMHG

## 2019-10-14 VITALS — DIASTOLIC BLOOD PRESSURE: 67 MMHG | SYSTOLIC BLOOD PRESSURE: 125 MMHG

## 2019-10-14 VITALS — SYSTOLIC BLOOD PRESSURE: 123 MMHG | DIASTOLIC BLOOD PRESSURE: 70 MMHG

## 2019-10-14 LAB
ALBUMIN SERPL-MCNC: 2.7 G/DL (ref 3.4–5)
ALBUMIN/GLOB SERPL: 0.6 {RATIO} (ref 1–1.7)
ALP SERPL-CCNC: 71 U/L (ref 46–116)
ALT SERPL-CCNC: 61 U/L (ref 14–59)
ANION GAP SERPL CALC-SCNC: 9 MMOL/L (ref 6–14)
AST SERPL-CCNC: 57 U/L (ref 15–37)
BASOPHILS # BLD AUTO: 0 X10^3/UL (ref 0–0.2)
BASOPHILS NFR BLD: 1 % (ref 0–3)
BILIRUB SERPL-MCNC: 0.2 MG/DL (ref 0.2–1)
BUN SERPL-MCNC: 9 MG/DL (ref 7–20)
BUN/CREAT SERPL: 9 (ref 6–20)
CALCIUM SERPL-MCNC: 9.1 MG/DL (ref 8.5–10.1)
CHLORIDE SERPL-SCNC: 104 MMOL/L (ref 98–107)
CO2 SERPL-SCNC: 29 MMOL/L (ref 21–32)
CREAT SERPL-MCNC: 1 MG/DL (ref 0.6–1)
EOSINOPHIL NFR BLD: 0.2 X10^3/UL (ref 0–0.7)
EOSINOPHIL NFR BLD: 2 % (ref 0–3)
ERYTHROCYTE [DISTWIDTH] IN BLOOD BY AUTOMATED COUNT: 14.6 % (ref 11.5–14.5)
GFR SERPLBLD BASED ON 1.73 SQ M-ARVRAT: 57.8 ML/MIN
GLOBULIN SER-MCNC: 4.4 G/DL (ref 2.2–3.8)
GLUCOSE SERPL-MCNC: 126 MG/DL (ref 70–99)
HCT VFR BLD CALC: 34.5 % (ref 36–47)
HGB BLD-MCNC: 11.3 G/DL (ref 12–15.5)
LYMPHOCYTES # BLD: 1.6 X10^3/UL (ref 1–4.8)
LYMPHOCYTES NFR BLD AUTO: 15 % (ref 24–48)
MCH RBC QN AUTO: 29 PG (ref 25–35)
MCHC RBC AUTO-ENTMCNC: 33 G/DL (ref 31–37)
MCV RBC AUTO: 88 FL (ref 79–100)
MONO #: 1 X10^3/UL (ref 0–1.1)
MONOCYTES NFR BLD: 10 % (ref 0–9)
NEUT #: 7.7 X10^3/UL (ref 1.8–7.7)
NEUTROPHILS NFR BLD AUTO: 73 % (ref 31–73)
PLATELET # BLD AUTO: 219 X10^3/UL (ref 140–400)
POTASSIUM SERPL-SCNC: 3.3 MMOL/L (ref 3.5–5.1)
PROT SERPL-MCNC: 7.1 G/DL (ref 6.4–8.2)
RBC # BLD AUTO: 3.95 X10^6/UL (ref 3.5–5.4)
SODIUM SERPL-SCNC: 142 MMOL/L (ref 136–145)
WBC # BLD AUTO: 10.5 X10^3/UL (ref 4–11)

## 2019-10-14 RX ADMIN — OXYBUTYNIN CHLORIDE SCH MG: 5 TABLET ORAL at 20:52

## 2019-10-14 RX ADMIN — METOPROLOL TARTRATE SCH MG: 25 TABLET ORAL at 20:53

## 2019-10-14 RX ADMIN — QUETIAPINE FUMARATE SCH MG: 100 TABLET ORAL at 20:52

## 2019-10-14 RX ADMIN — OXYBUTYNIN CHLORIDE SCH MG: 5 TABLET ORAL at 09:49

## 2019-10-14 RX ADMIN — IPRATROPIUM BROMIDE AND ALBUTEROL SULFATE SCH ML: .5; 3 SOLUTION RESPIRATORY (INHALATION) at 12:15

## 2019-10-14 RX ADMIN — METOPROLOL TARTRATE SCH MG: 25 TABLET ORAL at 09:48

## 2019-10-14 RX ADMIN — ATORVASTATIN CALCIUM SCH MG: 40 TABLET, FILM COATED ORAL at 20:52

## 2019-10-14 RX ADMIN — CLOPIDOGREL BISULFATE SCH MG: 75 TABLET ORAL at 09:48

## 2019-10-14 RX ADMIN — ZOLPIDEM TARTRATE SCH MG: 5 TABLET ORAL at 20:52

## 2019-10-14 RX ADMIN — IPRATROPIUM BROMIDE AND ALBUTEROL SULFATE SCH ML: .5; 3 SOLUTION RESPIRATORY (INHALATION) at 19:34

## 2019-10-14 RX ADMIN — IPRATROPIUM BROMIDE AND ALBUTEROL SULFATE SCH ML: .5; 3 SOLUTION RESPIRATORY (INHALATION) at 08:28

## 2019-10-14 RX ADMIN — LITHIUM CARBONATE SCH MG: 300 TABLET, FILM COATED, EXTENDED RELEASE ORAL at 20:52

## 2019-10-14 RX ADMIN — LITHIUM CARBONATE SCH MG: 300 TABLET, FILM COATED, EXTENDED RELEASE ORAL at 09:49

## 2019-10-14 RX ADMIN — LEVOTHYROXINE SODIUM SCH MCG: 125 TABLET ORAL at 05:26

## 2019-10-14 RX ADMIN — ASPIRIN SCH MG: 81 TABLET, COATED ORAL at 09:48

## 2019-10-14 RX ADMIN — IPRATROPIUM BROMIDE AND ALBUTEROL SULFATE SCH ML: .5; 3 SOLUTION RESPIRATORY (INHALATION) at 16:37

## 2019-10-14 RX ADMIN — Medication SCH APP: at 20:53

## 2019-10-14 RX ADMIN — DIVALPROEX SODIUM SCH MG: 250 TABLET, EXTENDED RELEASE ORAL at 09:47

## 2019-10-14 NOTE — PDOC
[facility] PROGRESS NOTE


Chief Complaint


Chief Complaint


Chest pain





History of Present Illness


History of Present Illness


10/13/19


Pt seen and examined 


Pt had an NSTEMI and was in the cath lab yesterday with likely coronary artery 

dissection 


Troponin peak 12.2


Pt was quite talkative today and seems to be at her baseline 


DW RN and pt's mother





Vitals/I&O


Vitals/I&O:





                                   Vital Signs








  Date Time  Temp Pulse Resp B/P (MAP) Pulse Ox O2 Delivery O2 Flow Rate FiO2


 


10/14/19 12:15     91 Room Air  


 


10/14/19 10:37 98.2 72 18 135/71 (92)    





 98.2       


 


10/14/19 02:35       2.0 














                                    I & O   


 


 10/13/19 10/13/19 10/14/19





 15:00 23:00 07:00


 


Intake Total 600 ml  1820 ml


 


Output Total 852 ml 300 ml 400 ml


 


Balance -252 ml -300 ml 1420 ml











Physical Exam


General:  Alert, No acute distress


Heart:  Regular rate


Lungs:  Clear, Other


Abdomen:  Normal bowel sounds


Extremities:  No clubbing, No cyanosis


Skin:  No rashes





Labs


Labs:





Laboratory Tests








Test


 10/14/19


03:25


 


White Blood Count


 10.5 x10^3/uL


(4.0-11.0)


 


Red Blood Count


 3.95 x10^6/uL


(3.50-5.40)


 


Hemoglobin


 11.3 g/dL


(12.0-15.5)


 


Hematocrit


 34.5 %


(36.0-47.0)


 


Mean Corpuscular Volume 88 fL () 


 


Mean Corpuscular Hemoglobin 29 pg (25-35) 


 


Mean Corpuscular Hemoglobin


Concent 33 g/dL


(31-37)


 


Red Cell Distribution Width


 14.6 %


(11.5-14.5)


 


Platelet Count


 219 x10^3/uL


(140-400)


 


Neutrophils (%) (Auto) 73 % (31-73) 


 


Lymphocytes (%) (Auto) 15 % (24-48) 


 


Monocytes (%) (Auto) 10 % (0-9) 


 


Eosinophils (%) (Auto) 2 % (0-3) 


 


Basophils (%) (Auto) 1 % (0-3) 


 


Neutrophils # (Auto)


 7.7 x10^3/uL


(1.8-7.7)


 


Lymphocytes # (Auto)


 1.6 x10^3/uL


(1.0-4.8)


 


Monocytes # (Auto)


 1.0 x10^3/uL


(0.0-1.1)


 


Eosinophils # (Auto)


 0.2 x10^3/uL


(0.0-0.7)


 


Basophils # (Auto)


 0.0 x10^3/uL


(0.0-0.2)


 


Sodium Level


 142 mmol/L


(136-145)


 


Potassium Level


 3.3 mmol/L


(3.5-5.1)


 


Chloride Level


 104 mmol/L


()


 


Carbon Dioxide Level


 29 mmol/L


(21-32)


 


Anion Gap 9 (6-14) 


 


Blood Urea Nitrogen 9 mg/dL (7-20) 


 


Creatinine


 1.0 mg/dL


(0.6-1.0)


 


Estimated GFR


(Cockcroft-Gault) 57.8 





 


BUN/Creatinine Ratio 9 (6-20) 


 


Glucose Level


 126 mg/dL


(70-99)


 


Calcium Level


 9.1 mg/dL


(8.5-10.1)


 


Total Bilirubin


 0.2 mg/dL


(0.2-1.0)


 


Aspartate Amino Transf


(AST/SGOT) 57 U/L (15-37) 





 


Alanine Aminotransferase


(ALT/SGPT) 61 U/L (14-59) 





 


Alkaline Phosphatase


 71 U/L


()


 


Total Protein


 7.1 g/dL


(6.4-8.2)


 


Albumin


 2.7 g/dL


(3.4-5.0)


 


Albumin/Globulin Ratio 0.6 (1.0-1.7) 











Review of Systems


Review of Systems:


Denied SOB and Weakness





Assessment and Plan


Assessmemt and Plan


Problems


Medical Problems:


(1) NSTEMI (non-ST elevated myocardial infarction)


Status: Acute  





Plan: 


1. Acute AMA MI with max troponin of 12 


2. Clean cath. 


3. Discharge





Comment


Review of Relevant


I have reviewed the following items wanda (where applicable) has been applied.


Medications:





Current Medications








 Medications


  (Trade)  Dose


 Ordered  Sig/Jeffry


 Route


 PRN Reason  Start Time


 Stop Time Status Last Admin


Dose Admin


 


 Clopidogrel


 Bisulfate


  (Plavix)  75 mg  DAILYWBKFT


 PO


   10/14/19 08:00


    10/14/19 09:48





 


 Diltiazem HCl


  (Cardizem 24hr


 Cd)  240 mg  DAILY


 PO


   10/13/19 13:00


    10/14/19 09:48





 


 Divalproex Sodium


  (Depakote Er)  750 mg  DAILY


 PO


   10/13/19 13:00


    10/14/19 09:47





 


 Albuterol/


 Ipratropium


  (Duoneb)  3 ml  RTQID


 NEB


   10/13/19 13:00


    10/14/19 12:15





 


 Levothyroxine


 Sodium


  (Synthroid)  125 mcg  DAILY06


 PO


   10/13/19 13:00


    10/14/19 05:26





 


 Multi-Ingred


 Cream/Lotion/Oil/


 Oint


  (Hydrocerin


 Cream)  1 lulu  HS


 TP


   10/13/19 21:00


    10/13/19 20:59





 


 Clonazepam


  (KlonoPIN)  2 mg  BID


 PO


   10/13/19 21:00


    10/14/19 09:47





 


 Lithium Carbonate  300 mg  BID


 PO


   10/13/19 13:00


 10/13/19 16:30 DC 10/13/19 13:37





 


 Ondansetron HCl


  (Zofran Odt)  4 mg  Q8HRS


 PO


   10/13/19 14:00


 10/13/19 16:32 DC 10/13/19 13:37





 


 Quetiapine


 Fumarate


  (SEROquel)  400 mg  QHS


 PO


   10/13/19 21:00


    10/13/19 21:01





 


 Zolpidem Tartrate


  (Ambien)  5 mg  QHS


 PO


   10/13/19 21:00


    10/13/19 20:59





 


 Lithium Carbonate


  (Lithobid)  300 mg  BID


 PO


   10/13/19 21:00


    10/14/19 09:49





 


 Oxybutynin


 Chloride


  (Ditropan)  2.5 mg  BID


 PO


   10/13/19 21:00


    10/14/19 09:49




















MARCIAL DAVIS III DO           Oct 14, 2019 12:40

## 2019-10-14 NOTE — PDOC
WENDY MCKEON APRISAAC 10/14/19 1233:


CARDIO Progress Notes


Date and Time


Date of Service


10/14/19


Time of Evaluation


1210





Subjective


Subjective:  No Chest Pain, No Palpitations, Other (breathing better today)





Vitals


Vitals





Vital Signs








  Date Time  Temp Pulse Resp B/P (MAP) Pulse Ox O2 Delivery O2 Flow Rate FiO2


 


10/14/19 12:15     91 Room Air  


 


10/14/19 10:37 98.2 72 18 135/71 (92)    





 98.2       


 


10/14/19 02:35       2.0 








Weight


Weight [ ]





Input and Output


Intake and Output











Intake and Output 


 


 10/14/19





 06:59


 


Intake Total 2420 ml


 


Output Total 1552 ml


 


Balance 868 ml


 


 


 


Intake Oral 1520 ml


 


IV Total 900 ml


 


Output Urine Total 1550 ml


 


Stool Total 2 ml


 


# Voids 2











Laboratory


Labs





Laboratory Tests








Test


 10/14/19


03:25


 


White Blood Count


 10.5 x10^3/uL


(4.0-11.0)


 


Red Blood Count


 3.95 x10^6/uL


(3.50-5.40)


 


Hemoglobin


 11.3 g/dL


(12.0-15.5)


 


Hematocrit


 34.5 %


(36.0-47.0)


 


Mean Corpuscular Volume 88 fL () 


 


Mean Corpuscular Hemoglobin 29 pg (25-35) 


 


Mean Corpuscular Hemoglobin


Concent 33 g/dL


(31-37)


 


Red Cell Distribution Width


 14.6 %


(11.5-14.5)


 


Platelet Count


 219 x10^3/uL


(140-400)


 


Neutrophils (%) (Auto) 73 % (31-73) 


 


Lymphocytes (%) (Auto) 15 % (24-48) 


 


Monocytes (%) (Auto) 10 % (0-9) 


 


Eosinophils (%) (Auto) 2 % (0-3) 


 


Basophils (%) (Auto) 1 % (0-3) 


 


Neutrophils # (Auto)


 7.7 x10^3/uL


(1.8-7.7)


 


Lymphocytes # (Auto)


 1.6 x10^3/uL


(1.0-4.8)


 


Monocytes # (Auto)


 1.0 x10^3/uL


(0.0-1.1)


 


Eosinophils # (Auto)


 0.2 x10^3/uL


(0.0-0.7)


 


Basophils # (Auto)


 0.0 x10^3/uL


(0.0-0.2)


 


Sodium Level


 142 mmol/L


(136-145)


 


Potassium Level


 3.3 mmol/L


(3.5-5.1)


 


Chloride Level


 104 mmol/L


()


 


Carbon Dioxide Level


 29 mmol/L


(21-32)


 


Anion Gap 9 (6-14) 


 


Blood Urea Nitrogen 9 mg/dL (7-20) 


 


Creatinine


 1.0 mg/dL


(0.6-1.0)


 


Estimated GFR


(Cockcroft-Gault) 57.8 





 


BUN/Creatinine Ratio 9 (6-20) 


 


Glucose Level


 126 mg/dL


(70-99)


 


Calcium Level


 9.1 mg/dL


(8.5-10.1)


 


Total Bilirubin


 0.2 mg/dL


(0.2-1.0)


 


Aspartate Amino Transf


(AST/SGOT) 57 U/L (15-37) 





 


Alanine Aminotransferase


(ALT/SGPT) 61 U/L (14-59) 





 


Alkaline Phosphatase


 71 U/L


()


 


Total Protein


 7.1 g/dL


(6.4-8.2)


 


Albumin


 2.7 g/dL


(3.4-5.0)


 


Albumin/Globulin Ratio 0.6 (1.0-1.7) 











Physical Exam


HEENT:  Neck Supple W Full Motion


Chest:  Symmetric


LUNGS:  Clear to Auscultation


Heart:  S1S2


Abdomen:  Soft N/T


Extremities:  No Edema


Neurology:  alert, follow commands





Assessment


Assessment


1.  Acute hypoxic respiratory failure, multifactorial. Better compensated


2.  NSTEMI; troponin trended downward. Cath showed probable mid to distal LAD 

spontaneous coronary artery dissection. 


3.  Acute on chronic diastolic/systolic CHF,  possible cor pulmonale


4.  CMP; Takatsubo versus SCAD of the LAD


5.  Hypertension; controlled  








Recommendations





Echocardiogram 


Secondary prevention including DAPT with ASA, Plavix. BB therapy 


HF optimization 


Add lisinopril





LETICIA RODRIGUEZ MD 10/15/19 0909:


CARDIO Progress Notes


Plan


Plan


Late entry for 10/14/2019


Pt. seen and examined. Agree with above NP. 


Supportive care. Patient overall improving.











WENDY MCKEON           Oct 14, 2019 12:33


LETICIA RODRIGUEZ MD       Oct 15, 2019 09:09

## 2019-10-14 NOTE — NUR
Patient unable to dc today pending ECHO per cardiology.  Advised patient and mother at 
bedside that once ECHO is done and read, patient should be able to return to facility.  Both 
verbalized understanding.

## 2019-10-14 NOTE — CARD
MR#: [id number]

Account#: [id number]

Accession#: [id number]

Date of Study: 10/12/2019

Ordering Physician: LETICIA RODRIGUEZ, 

Referring Physician: LETICIA RODRIGUEZ, 

Tech: RT Jose (R) LAZAURS





--------------- APPROVED REPORT --------------





Technologist: RT Jose (R) LAZARUS

Nurse: Monica Rojas RN



Procedure(s) performed: Fluoro Time: 2.0 min

Dose: 50 Gycm2

Mod Sedation: 40 mins

Contrast: 58ml

LHC, Coronary angiography, RHC



HISTORY

: The patient is a 54 year-old female with a history of .



INDICATION

The indication(s) include : non-STEMI .



PROCEDURE NARRATIVE

INFORMED CONSENT: After explaining the risks and benefits of the procedure and alternatives, informed
 consent was obtained.



The patient was brought electively to the cardiac catheterization lab.  A timeout was performed confi
rming the patient's name, date of birth, procedure, and site of procedure.  All necessary personnel w
ere wearing the appropriate protective equipment and radiation monitor devices.   (See nursing notes 
for medications administered).  



ACCESS:

The right wrist was sterilely prepped and draped in the usual fashion.  The right wrist was infiltrat
ed with 1 mL of 2% lidocaine for subcutaneous anesthesia.  A 6 Slov[place] Terumo glide sheath was inserte
d into the right radial artery without difficulty. 



Right IJ access was obtained via ultrasound guidance. A 5Fr sheath was placed in the RIJ w/o difficul
ty. 



CORONARY ANGIOGRAPHY:

 Right and left coronary angiography was performed using a 6Fr TIG 4.0 catheter.  Left ventricular en
d diastolic pressure was obtained with a pigtail catheter and pullback was performed after left ventr
iculography.  All catheter exchanges and advancements were performed over a guidewire. 



RHC: 

Due to persistent dyspnea and hypoxia with concern for cardiogenic shock, a RHC was performed.  



CLOSURE:

At case completion the right radial sheath was removed and a Terumo radial band was applied with 13 m
l of air.   The right IJ sheath was sutured to the skin. 



COMPLICATIONS: 

The patient tolerated the procedure well and there were no immediate complications.



FINDINGS:

HEMODYNAMICS: 

LVEDP 22 mm Hg

No gradient on LV to aortic pullback. 

AO: 128/78



LEFT VENTRICULOGRAM: EF40 %

*Diffuse global hypokinesis with the distal 1/3 of the LV being severely hypokinetic. 



CORONARY ANGIOGRAPHY: 

LM is a large caliber vessel with normal angiographic appearance. 

LAD is a large caliber vessel with severe rapid tapering of the mid to distal LAD, likely due to SCAD
. 

D1s is a moderate caliber vessel with normal angiographic apeparance. 

LCx is a moderate caliber non-dominant vessel with normal angiographic appearance. 

OM1 is a moderate caliber vessel with normal angiographic appearance. 

RCA is a large caliber dominant vessel with normal angiographic appearance. 

RPDA and RPL are moderate caliber vessels with normal angiographic appearance.



RHC findings: 

PCWP: 22 mm Hg

PA: 40/15

RV: 42/12

RA: 13

PA sat: 62%. 

Rradial artery sat: 92%







Conclusion

1. Acute LV pressure overload, LVEDP 22 mm Hg

2. Probable mid to distal LAD spontaneous coronary artery dissection. 

3. Mildly elevated right sided filling pressures, with near normal C.O. 



Recommendations

Aggressive Medical Therapy



Signed by : Leticia Rodriguez, 

Electronically Approved : 10/14/2019 14:57:53

## 2019-10-14 NOTE — SNU/HH DC
DISCHARGE WITH HOME HEALTH


DISCHARGE INFORMATION:


Final Diagnosis:


Problems


Medical Problems:


(1) NSTEMI (non-ST elevated myocardial infarction)


Status: Acute  








Condition on Discharge:  Stable





CODE STATUS:


Code Status:  Full





HOME HEALTH:


Face to Face:


I certify this patient is under my care and that I, or a nurse practitioner or 

physician's assistant working with me, had a face to face encounter that meets 

the physician face to face encounter requirements with this patient on [].


Medical Complications:  Other (recent heart attack)


RN For Eval/Treatment:  Yes


Physical Therapy For:  Evalulation/Treatment


Occupational Therapy For:  Evaluation/Treatment


Speech Language Pathology For:  Evaluation/Treatment


Home Health Aide For:  Self-care


MSW For:  Community Resources


Pt Meets Homebound Status:  Poor coordination w/ amb.





POST DISCHARGE ORDERS:


Activity Instructions for Disc:  No restrictions


DIET AFTER DISCHARGE:  Cardiac





TREATMENT/EQUIPMENT ORDERS:


Adaptive Equipment Issued:  None





CERTIFICATION STATEMENT:


Certification Statement:


Certification Statement: Based on the above finding, I certify that this patient

 is confined to the home and needs intermittent skilled nursing care, physical 

therapy and/or speech therapy, or continues to need occupational therapy.~ This 

patient is under my care, and I have initiated the establishment of the plan of 

care.~ This patient will be followed by myself or a community physician who will

 periodically review the plan of care.


Home Meds


Reported Medications


Lithium Carbonate (LITHIUM CARBONATE) 300 Mg Tablet.er, 300 MG PO BID, TAB.SR


   10/13/19


Diltiazem Hcl (CARDIZEM CD) 240 Mg Cap.er.24h, 1 CAP PO DAILY for heart failure,

 #30 CAP 5 Refills


   10/12/19


Acetaminophen (TYLENOL) 325 Mg Tablet, 650 MG PO PRN Q6HRS PRN for PAIN for 30 

Days, TAB


   10/12/19


Ondansetron Hcl (ZOFRAN) 4 Mg Tablet, 1 TAB PO Q8HRS PRN for NAUSEA for 30 Days,

 #90 TAB


   10/12/19


Polyethylene Glycol 3350 (MIRALAX) 17 Gm Powd.pack, 1 PACKET PO DAILY PRN for 

CONSTIPATION for 30 Days, #30 PACKET 0 Refills


   dissolve in water


   10/12/19


Loperamide Hcl (ANTI-DIARRHEAL) 2 Mg Capsule, 2 MG PO PRN Q1HR PRN for DIARRHEA 

for 30 Days, CAP


   10/12/19


Ibuprofen (IBUPROFEN) 400 Mg Tablet, 400 MG PO PRN Q6HRS PRN for INFLAMMATION 

for 30 Days, TAB


   10/12/19


Ipratropium/Albuterol Sulfate (DUONEB 0.5-3(2.5) MG/3 ML) 3 Ml Ampul.neb, 3 ML 

NEB QID PRN for SHORTNESS OF BREATH for 30 Days, #120 EACH


   10/12/19


Divalproex Sodium (DIVALPROEX SODIUM ER) 500 Mg Tab.er.24h, 750 MG PO QHS for 

Bipolar Disorder for 30 Days, #45 TAB.SR


   10/12/19


Levothyroxine Sodium (LEVOTHYROXINE SODIUM) 125 Mcg Tablet, 125 MCG PO DAILYAC 

for THYROID SUPPLEMENT, #30 TAB 0 Refills


   10/12/19


Oxybutynin Chloride (DITROPAN XL) 5 Mg Tab.er.24, 5 MG PO QHS, #30 TAB.SR


   10/12/19


Metoprolol Tartrate (METOPROLOL TARTRATE) 25 Mg Tablet, 1 TAB PO BID, #180 TAB 1

 Refill


   3/25/16


Quetiapine Fumarate (SEROQUEL) 400 Mg Tablet, 1 TAB PO QHS, #30 TAB 1 Refill


   3/7/16


Paliperidone (INVEGA) 6 Mg Tab.er.24, 2 TAB PO DAILY, #30 TAB 2 Refills


   3/7/16


Mineral Oil/Petrolatum,White (EUCERIN CREME ***) 120 Gm Cream..g., 1 PAOLA TP HS 

PRN for SKIN BREAKDOWN, #1 TUBE


   3/7/16


Clonazepam (CLONAZEPAM) 2 Mg Tablet, 1 TAB PO BID, #90 TAB


   3/7/16


Zolpidem Tartrate (AMBIEN) 10 Mg Tablet, 10 MG PO QHS, TAB 0 Refills


   3/7/16


Discontinued Reported Medications


Lithium Carbonate (LITHIUM CARBONATE) 300 Mg Tablet, 1 TAB PO BID for manic 

episode w/psychosis for 30 Days, #60 TAB 1 Refill


   10/12/19


Diltiazem Hcl (CARDIZEM CD) 180 Mg Cap.er.24h, 1 CAP PO DAILY for heart failure,

 #90 CAP 1 Refill


   10/12/19


Diltiazem Hcl (DILTIAZEM 24HR CD) 120 Mg Cap.er.24h, 1 CAP PO DAILY, #90 CAP 1 

Refill


   3/25/16


Guaifenesin (TUSSIN CHEST CONGESTION) 100 Mg/5 Ml Liquid, 100 MG PO PRN Q4HRS 

PRN for COUGH, LIQUID


   3/7/16


Quetiapine Fumarate (SEROQUEL) 300 Mg Tablet, 1 TAB PO QHS, #30 TAB 1 Refill


   3/7/16


Nicotine Polacrilex (NICOTINE GUM) 4 Mg Gum, 4 MG BC PRN PRN for SMOKING 

CESSATION


   3/7/16


Levothyroxine Sodium (LEVOTHYROXINE SODIUM) 100 Mcg Tablet, 1 TAB PO DAILY, #90 

TAB 1 Refill


   3/7/16











MARCIAL DAVIS III DO           Oct 14, 2019 12:44

## 2019-10-14 NOTE — NUR
SS following for discharge planning. SS reviewed pt chart. Pt is from [facility], 
[phone]; fax [phone]. Discharge orders on the chart for return to [facility]. SS 
phoned and faxed discharge orders and clinical to [facility]. Pt will discharge today and 
return to [facility] at 1530. [facility] to provide transport. Pt, pt's family, and pt's 
RN notified.

## 2019-10-15 VITALS — DIASTOLIC BLOOD PRESSURE: 63 MMHG | SYSTOLIC BLOOD PRESSURE: 154 MMHG

## 2019-10-15 VITALS — DIASTOLIC BLOOD PRESSURE: 75 MMHG | SYSTOLIC BLOOD PRESSURE: 129 MMHG

## 2019-10-15 VITALS — SYSTOLIC BLOOD PRESSURE: 147 MMHG | DIASTOLIC BLOOD PRESSURE: 83 MMHG

## 2019-10-15 VITALS — DIASTOLIC BLOOD PRESSURE: 62 MMHG | SYSTOLIC BLOOD PRESSURE: 128 MMHG

## 2019-10-15 LAB
ALBUMIN SERPL-MCNC: 2.4 G/DL (ref 3.4–5)
ALBUMIN/GLOB SERPL: 0.6 {RATIO} (ref 1–1.7)
ALP SERPL-CCNC: 59 U/L (ref 46–116)
ALT SERPL-CCNC: 51 U/L (ref 14–59)
ANION GAP SERPL CALC-SCNC: 8 MMOL/L (ref 6–14)
AST SERPL-CCNC: 29 U/L (ref 15–37)
BASOPHILS # BLD AUTO: 0.1 X10^3/UL (ref 0–0.2)
BASOPHILS NFR BLD: 1 % (ref 0–3)
BILIRUB SERPL-MCNC: 0.2 MG/DL (ref 0.2–1)
BUN SERPL-MCNC: 7 MG/DL (ref 7–20)
BUN/CREAT SERPL: 8 (ref 6–20)
CALCIUM SERPL-MCNC: 8.9 MG/DL (ref 8.5–10.1)
CHLORIDE SERPL-SCNC: 108 MMOL/L (ref 98–107)
CO2 SERPL-SCNC: 28 MMOL/L (ref 21–32)
CREAT SERPL-MCNC: 0.9 MG/DL (ref 0.6–1)
EOSINOPHIL NFR BLD: 0.3 X10^3/UL (ref 0–0.7)
EOSINOPHIL NFR BLD: 3 % (ref 0–3)
ERYTHROCYTE [DISTWIDTH] IN BLOOD BY AUTOMATED COUNT: 14.7 % (ref 11.5–14.5)
GFR SERPLBLD BASED ON 1.73 SQ M-ARVRAT: 65.2 ML/MIN
GLOBULIN SER-MCNC: 4 G/DL (ref 2.2–3.8)
GLUCOSE SERPL-MCNC: 113 MG/DL (ref 70–99)
HCT VFR BLD CALC: 32.5 % (ref 36–47)
HGB BLD-MCNC: 10.7 G/DL (ref 12–15.5)
LYMPHOCYTES # BLD: 1.6 X10^3/UL (ref 1–4.8)
LYMPHOCYTES NFR BLD AUTO: 16 % (ref 24–48)
MCH RBC QN AUTO: 28 PG (ref 25–35)
MCHC RBC AUTO-ENTMCNC: 33 G/DL (ref 31–37)
MCV RBC AUTO: 87 FL (ref 79–100)
MONO #: 1.1 X10^3/UL (ref 0–1.1)
MONOCYTES NFR BLD: 11 % (ref 0–9)
NEUT #: 7.1 X10^3/UL (ref 1.8–7.7)
NEUTROPHILS NFR BLD AUTO: 69 % (ref 31–73)
PLATELET # BLD AUTO: 233 X10^3/UL (ref 140–400)
POTASSIUM SERPL-SCNC: 4 MMOL/L (ref 3.5–5.1)
PROT SERPL-MCNC: 6.4 G/DL (ref 6.4–8.2)
RBC # BLD AUTO: 3.76 X10^6/UL (ref 3.5–5.4)
SODIUM SERPL-SCNC: 144 MMOL/L (ref 136–145)
WBC # BLD AUTO: 10.2 X10^3/UL (ref 4–11)

## 2019-10-15 RX ADMIN — IPRATROPIUM BROMIDE AND ALBUTEROL SULFATE SCH ML: .5; 3 SOLUTION RESPIRATORY (INHALATION) at 11:36

## 2019-10-15 RX ADMIN — OXYBUTYNIN CHLORIDE SCH MG: 5 TABLET ORAL at 08:33

## 2019-10-15 RX ADMIN — LEVOTHYROXINE SODIUM SCH MCG: 125 TABLET ORAL at 05:07

## 2019-10-15 RX ADMIN — ASPIRIN SCH MG: 81 TABLET, COATED ORAL at 08:34

## 2019-10-15 RX ADMIN — LITHIUM CARBONATE SCH MG: 300 TABLET, FILM COATED, EXTENDED RELEASE ORAL at 08:34

## 2019-10-15 RX ADMIN — CLOPIDOGREL BISULFATE SCH MG: 75 TABLET ORAL at 08:33

## 2019-10-15 RX ADMIN — IPRATROPIUM BROMIDE AND ALBUTEROL SULFATE SCH ML: .5; 3 SOLUTION RESPIRATORY (INHALATION) at 08:57

## 2019-10-15 RX ADMIN — DIVALPROEX SODIUM SCH MG: 250 TABLET, EXTENDED RELEASE ORAL at 08:34

## 2019-10-15 NOTE — PDOC3
Discharge Summary


Visit Information


Date of Admission:  Oct 12, 2019


Date of Discharge:  Oct 15, 2019


Admitting Diagnosis:  NSTEMI


Final Diagnosis


Problems


Medical Problems:


(1) NSTEMI (non-ST elevated myocardial infarction)


Status: Acute  











Brief Hospital Course


Allergies





                                    Allergies








Coded Allergies Type Severity Reaction Last Updated Verified


 


  haloperidol Allergy Severe tongue swelling 10/12/19 Yes








Vital Signs





Vital Signs








  Date Time  Temp Pulse Resp B/P (MAP) Pulse Ox O2 Delivery O2 Flow Rate FiO2


 


10/15/19 11:41     91 Room Air  


 


10/15/19 11:05  63  154/63    


 


10/15/19 10:42 97.7  20     





 97.7       


 


10/15/19 08:00       2.0 








Lab Results





Laboratory Tests








Test


 10/14/19


03:25 10/15/19


04:40


 


White Blood Count


 10.5 x10^3/uL


(4.0-11.0) 10.2 x10^3/uL


(4.0-11.0)


 


Red Blood Count


 3.95 x10^6/uL


(3.50-5.40) 3.76 x10^6/uL


(3.50-5.40)


 


Hemoglobin


 11.3 g/dL


(12.0-15.5) 10.7 g/dL


(12.0-15.5)


 


Hematocrit


 34.5 %


(36.0-47.0) 32.5 %


(36.0-47.0)


 


Mean Corpuscular Volume 88 fL ()  87 fL () 


 


Mean Corpuscular Hemoglobin 29 pg (25-35)  28 pg (25-35) 


 


Mean Corpuscular Hemoglobin


Concent 33 g/dL


(31-37) 33 g/dL


(31-37)


 


Red Cell Distribution Width


 14.6 %


(11.5-14.5) 14.7 %


(11.5-14.5)


 


Platelet Count


 219 x10^3/uL


(140-400) 233 x10^3/uL


(140-400)


 


Neutrophils (%) (Auto) 73 % (31-73)  69 % (31-73) 


 


Lymphocytes (%) (Auto) 15 % (24-48)  16 % (24-48) 


 


Monocytes (%) (Auto) 10 % (0-9)  11 % (0-9) 


 


Eosinophils (%) (Auto) 2 % (0-3)  3 % (0-3) 


 


Basophils (%) (Auto) 1 % (0-3)  1 % (0-3) 


 


Neutrophils # (Auto)


 7.7 x10^3/uL


(1.8-7.7) 7.1 x10^3/uL


(1.8-7.7)


 


Lymphocytes # (Auto)


 1.6 x10^3/uL


(1.0-4.8) 1.6 x10^3/uL


(1.0-4.8)


 


Monocytes # (Auto)


 1.0 x10^3/uL


(0.0-1.1) 1.1 x10^3/uL


(0.0-1.1)


 


Eosinophils # (Auto)


 0.2 x10^3/uL


(0.0-0.7) 0.3 x10^3/uL


(0.0-0.7)


 


Basophils # (Auto)


 0.0 x10^3/uL


(0.0-0.2) 0.1 x10^3/uL


(0.0-0.2)


 


Sodium Level


 142 mmol/L


(136-145) 144 mmol/L


(136-145)


 


Potassium Level


 3.3 mmol/L


(3.5-5.1) 4.0 mmol/L


(3.5-5.1)


 


Chloride Level


 104 mmol/L


() 108 mmol/L


()


 


Carbon Dioxide Level


 29 mmol/L


(21-32) 28 mmol/L


(21-32)


 


Anion Gap 9 (6-14)  8 (6-14) 


 


Blood Urea Nitrogen 9 mg/dL (7-20)  7 mg/dL (7-20) 


 


Creatinine


 1.0 mg/dL


(0.6-1.0) 0.9 mg/dL


(0.6-1.0)


 


Estimated GFR


(Cockcroft-Gault) 57.8 


 65.2 





 


BUN/Creatinine Ratio 9 (6-20)  8 (6-20) 


 


Glucose Level


 126 mg/dL


(70-99) 113 mg/dL


(70-99)


 


Calcium Level


 9.1 mg/dL


(8.5-10.1) 8.9 mg/dL


(8.5-10.1)


 


Magnesium Level


 2.2 mg/dL


(1.8-2.4) 





 


Total Bilirubin


 0.2 mg/dL


(0.2-1.0) 0.2 mg/dL


(0.2-1.0)


 


Aspartate Amino Transf


(AST/SGOT) 57 U/L (15-37) 


 29 U/L (15-37) 





 


Alanine Aminotransferase


(ALT/SGPT) 61 U/L (14-59) 


 51 U/L (14-59) 





 


Alkaline Phosphatase


 71 U/L


() 59 U/L


()


 


Total Protein


 7.1 g/dL


(6.4-8.2) 6.4 g/dL


(6.4-8.2)


 


Albumin


 2.7 g/dL


(3.4-5.0) 2.4 g/dL


(3.4-5.0)


 


Albumin/Globulin Ratio 0.6 (1.0-1.7)  0.6 (1.0-1.7) 








Laboratory Tests








Test


 10/15/19


04:40


 


White Blood Count


 10.2 x10^3/uL


(4.0-11.0)


 


Red Blood Count


 3.76 x10^6/uL


(3.50-5.40)


 


Hemoglobin


 10.7 g/dL


(12.0-15.5)


 


Hematocrit


 32.5 %


(36.0-47.0)


 


Mean Corpuscular Volume 87 fL () 


 


Mean Corpuscular Hemoglobin 28 pg (25-35) 


 


Mean Corpuscular Hemoglobin


Concent 33 g/dL


(31-37)


 


Red Cell Distribution Width


 14.7 %


(11.5-14.5)


 


Platelet Count


 233 x10^3/uL


(140-400)


 


Neutrophils (%) (Auto) 69 % (31-73) 


 


Lymphocytes (%) (Auto) 16 % (24-48) 


 


Monocytes (%) (Auto) 11 % (0-9) 


 


Eosinophils (%) (Auto) 3 % (0-3) 


 


Basophils (%) (Auto) 1 % (0-3) 


 


Neutrophils # (Auto)


 7.1 x10^3/uL


(1.8-7.7)


 


Lymphocytes # (Auto)


 1.6 x10^3/uL


(1.0-4.8)


 


Monocytes # (Auto)


 1.1 x10^3/uL


(0.0-1.1)


 


Eosinophils # (Auto)


 0.3 x10^3/uL


(0.0-0.7)


 


Basophils # (Auto)


 0.1 x10^3/uL


(0.0-0.2)


 


Sodium Level


 144 mmol/L


(136-145)


 


Potassium Level


 4.0 mmol/L


(3.5-5.1)


 


Chloride Level


 108 mmol/L


()


 


Carbon Dioxide Level


 28 mmol/L


(21-32)


 


Anion Gap 8 (6-14) 


 


Blood Urea Nitrogen 7 mg/dL (7-20) 


 


Creatinine


 0.9 mg/dL


(0.6-1.0)


 


Estimated GFR


(Cockcroft-Gault) 65.2 





 


BUN/Creatinine Ratio 8 (6-20) 


 


Glucose Level


 113 mg/dL


(70-99)


 


Calcium Level


 8.9 mg/dL


(8.5-10.1)


 


Total Bilirubin


 0.2 mg/dL


(0.2-1.0)


 


Aspartate Amino Transf


(AST/SGOT) 29 U/L (15-37) 





 


Alanine Aminotransferase


(ALT/SGPT) 51 U/L (14-59) 





 


Alkaline Phosphatase


 59 U/L


()


 


Total Protein


 6.4 g/dL


(6.4-8.2)


 


Albumin


 2.4 g/dL


(3.4-5.0)


 


Albumin/Globulin Ratio 0.6 (1.0-1.7) 








Brief Hospital Course


Ms Yee is a 53yo F w/ PMHx bipolar disorder long term SNF resident who is 

admitted for shortness of breath found with an NSTEMI and went to cath lab 10/12

with likely coronary artery dissection vs stress induced cardiomyopathy found. 

Troponin peak 12.2


Pt was quite talkative today and seems to be at her baseline. Is awaiting an 

echocardiogram currently. No pain complaints. Mild shortness of breath


DW RN and pt's mother





Chest pain


Acute hypoxic respiratory failure


Bipolar disorder


Anxiety


hypothyroidism


chronic diastolic CHF


hypertension


insomnia


urinary incontinence








Greater than 30 minutes spent on discharge





Discharge Information


Condition at Discharge:  Improved


Follow Up:  Weeks


Disposition/Orders:  D/C to Another Facility


Scheduled


Clonazepam (Clonazepam) 2 Mg Tablet, 1 TAB PO BID, #90 (Reported)


   Entered as Reported by: KAIDEN VERMA on 3/7/16 0248


   Last Taken: Unknown Dose on 10/11/19 2000     Last Action: Edited on 10/13/19

1629 by ROYCE DEL ROSARIO [facility]


Diltiazem Hcl (Cardizem Cd) 240 Mg Cap.er.24h, 1 CAP PO DAILY for heart failure,

#30 Ref 5 (Reported)


   Entered as Reported by: MARELY SMALLS RN on 10/12/19 0610


   Last Taken: Unknown Dose on 10/11/19 2000     Last Action: Continued on 

10/13/19 1220 by RD NICHOLAS RN


Divalproex Sodium (Divalproex Sodium Er) 500 Mg Tab.er.24h, 750 MG PO QHS for 

Bipolar Disorder for 30 Days, #45 (Reported)


   Entered as Reported by: MARELY SMALLS RN on 10/12/19 0609


   Last Taken: Unknown Dose on 10/11/19 2000     Last Action: Edited on 10/13/19

1629 by ROYCE DEL ROSARIO [facility]


Levothyroxine Sodium (Levothyroxine Sodium) 125 Mcg Tablet, 125 MCG PO DAILYAC 

for THYROID SUPPLEMENT, #30 Ref 0 (Reported)


   Entered as Reported by: MARELY SMALLS RN on 10/12/19 0609


   Last Action: Continued on 10/13/19 1220 by RD NICHOLAS RN


Lithium Carbonate (Lithium Carbonate) 300 Mg Tablet.er, 300 MG PO BID, 

(Reported)


   Entered as Reported by: ROYCE DEL ROSARIO RPH on 10/13/19 1629


   Last Action: New Order on 10/13/19 1629 by ROYCE DEL ROSARIO [facility]


Metoprolol Tartrate (Metoprolol Tartrate) 25 Mg Tablet, 1 TAB PO BID, #180 Ref 1

(Reported)


   Entered as Reported by: CARMENZA MARQUEZ on 3/25/16 1546


   Last Taken: Unknown Dose on 10/11/19 2000     Last Action: Continued on 

10/13/19 1220 by RD NICHOLAS RN


Oxybutynin Chloride (Ditropan Xl) 5 Mg Tab.er.24, 5 MG PO QHS, #30 (Reported)


   Entered as Reported by: MARELY SMALLS RN on 10/12/19 0547


   Last Taken: Unknown Dose on 10/11/19 2000     Last Action: Edited on 10/13/19

1632 by ROYCE DEL ROSARIO RP


Paliperidone (Invega) 6 Mg Tab.er.24, 2 TAB PO DAILY, #30 Ref 2 (Reported)


   Entered as Reported by: KAIDEN VERMA on 3/7/16 0248


   Last Taken: Unknown Dose on 10/11/19 2000     Last Action: Converted on 

10/13/19 1220 by RD NICHOLAS RN


Quetiapine Fumarate (Seroquel) 400 Mg Tablet, 1 TAB PO QHS, #30 Ref 1 (Reported)


   Entered as Reported by: KAIDEN VERMA on 3/7/16 0248


   Last Taken: Unknown Dose on 10/11/19 2000     Last Action: Converted on 

10/13/19 1220 by RD NICHOLAS RN


Zolpidem Tartrate (Ambien) 10 Mg Tablet, 10 MG PO QHS, Ref 0 (Reported)


   Entered as Reported by: KAIDEN VERMA on 3/7/16 0248


   Last Taken: Unknown Dose on 10/11/19 2000     Last Action: Converted on 

10/13/19 1220 by RD NICHOLAS RN





Scheduled PRN


Acetaminophen (Tylenol) 325 Mg Tablet, 650 MG PO PRN Q6HRS PRN for PAIN for 30 

Days, (Reported)


   Entered as Reported by: MARELY SMALLS RN on 10/12/19 0609


   Last Taken: Unknown Dose on 10/11/19 2000     Last Action: Edited on 10/13/19

1629 by ROYCE DEL ROSARIO [facility]


Ibuprofen (Ibuprofen) 400 Mg Tablet, 400 MG PO PRN Q6HRS PRN for INFLAMMATION 

for 30 Days, (Reported)


   Entered as Reported by: MARELY SMALLS RN on 10/12/19 0609


   Last Taken: Unknown Dose on 10/11/19 2000     Last Action: Continued on 

10/13/19 1220 by RD NICHOLAS RN


Ipratropium/Albuterol Sulfate (Duoneb 0.5-3(2.5) Mg/3 Ml) 3 Ml Ampul.neb, 3 ML 

NEB QID PRN for SHORTNESS OF BREATH for 30 Days, #120 (Reported)


   Entered as Reported by: MARELY SMALLS RN on 10/12/19 0609


   Last Taken: Unknown Dose on Unknown Date & Time     Last Action: Edited on 

10/13/19 1629 by ROYCE DEL ROSARIO [facility]


Loperamide Hcl (Anti-Diarrheal) 2 Mg Capsule, 2 MG PO PRN Q1HR PRN for DIARRHEA 

for 30 Days, (Reported)


   Entered as Reported by: MARELY SMALLS RN on 10/12/19 0609


   Last Taken: Unknown Dose on Unknown Date & Time     Last Action: Edited on 

10/13/19 1629 by ROYCE DEL ROSARIO RP


Mineral Oil/Petrolatum,White (Eucerin Creme ***) 120 Gm Cream..g., 1 PAOLA TP HS 

PRN for SKIN BREAKDOWN, #1 (Reported)


   Entered as Reported by: KAIDEN VERMA on 3/7/16 0248


   Last Taken: Unknown Dose on 10/11/19 2000     Last Action: Edited on 10/13/19

1629 by ROYCE DEL ROSARIO RP


Ondansetron Hcl (Zofran) 4 Mg Tablet, 1 TAB PO Q8HRS PRN for NAUSEA for 30 Days,

#90 (Reported)


   Entered as Reported by: MARELY SMALLS RN on 10/12/19 0609


   Last Taken: Unknown Dose on Unknown Date & Time     Last Action: Edited on 

10/13/19 1629 by ROYCE DEL ROSARIO RP


Polyethylene Glycol 3350 (Miralax) 17 Gm Powd.pack, 1 PACKET PO DAILY PRN for 

CONSTIPATION for 30 Days, #30 Ref 0 (Reported)


   dissolve in water 


   Entered as Reported by: MARELY SMALLS RN on 10/12/19 0609


   Last Taken: Unknown Dose on Unknown Date & Time     Last Action: Edited on 

10/13/19 1629 by ROYCE DEL ROSARIO [facility]





Discontinued Medications


Diltiazem Hcl (Diltiazem 24HR Cd) 120 Mg Cap.er.24h, 1 CAP PO DAILY, #90 Ref 1 

(Reported)


   Entered as Reported by: CARMENZA MARQUEZ on 3/25/16 1546


   Last Action: Discontinued on 10/12/19 0608 by MARELY SMALLS RN


Diltiazem Hcl (Cardizem Cd) 180 Mg Cap.er.24h, 1 CAP PO DAILY for heart failure,

#90 Ref 1 (Reported)


   Entered as Reported by: MARELY SMALLS RN on 10/12/19 0609


   Last Taken: Unknown Dose on 10/11/19 2000     Last Action: Discontinued on 

10/12/19 0610 by MARELY SMALLS RN


Guaifenesin (Tussin Chest Congestion) 100 Mg/5 Ml Liquid, 100 MG PO PRN Q4HRS 

PRN for COUGH, (Reported)


   Entered as Reported by: KAIDEN VERMA on 3/7/16 0248


   Last Action: Discontinued on 10/12/19 0608 by MARELY SMALLS RN


Levothyroxine Sodium (Levothyroxine Sodium) 100 Mcg Tablet, 1 TAB PO DAILY, #90 

Ref 1 (Reported)


   Discontinued Reason: Prescription changed


   Entered as Reported by: KAIDEN VERMA on 3/7/16 0248


Lithium Carbonate (Lithium Carbonate) 300 Mg Tablet, 1 TAB PO BID for manic ep

isode w/psychosis for 30 Days, #60 Ref 1 (Reported)


   Discontinued Reason: CHANGE


   Entered as Reported by: MARELY SMALLS RN on 10/12/19 0609


   Last Taken: Unknown Dose on 10/11/19 2000     Last Action: Discontinued on 

10/13/19 1629 by ROYCE DEL ROSARIO [facility]


Nicotine Polacrilex (Nicotine Gum) 4 Mg Gum, 4 MG BC PRN PRN for SMOKING 

CESSATION, (Reported)


   Entered as Reported by: KAIDEN VERMA on 3/7/16 0248


   Last Action: Discontinued on 10/12/19 0535 by MARELY SMALLS RN


Quetiapine Fumarate (Seroquel) 300 Mg Tablet, 1 TAB PO QHS, #30 Ref 1 (Reported)


   Entered as Reported by: KAIDEN VERMA on 3/7/16 0248


   Last Action: Discontinued on 10/12/19 0608 by MARIO BECKER CHRISTOPHER S MD        Oct 15, 2019 14:10

## 2019-10-15 NOTE — PDOC
WENDY MCKEON APRISAAC 10/15/19 1353:


CARDIO Progress Notes


Date and Time


Date of Service


10/15/19


Time of Evaluation


1310





Subjective


Subjective:  No Chest Pain, No shortness of breath, No Palpitations





Vitals


Vitals





Vital Signs








  Date Time  Temp Pulse Resp B/P (MAP) Pulse Ox O2 Delivery O2 Flow Rate FiO2


 


10/15/19 11:41     91 Room Air  


 


10/15/19 11:05  63  154/63    


 


10/15/19 10:42 97.7  20     





 97.7       


 


10/15/19 08:00       2.0 








Weight


Weight [ ]





Input and Output


Intake and Output











Intake and Output 


 


 10/15/19





 07:00


 


Intake Total 1940 ml


 


Output Total 700 ml


 


Balance 1240 ml


 


 


 


Intake Oral 1940 ml


 


Output Urine Total 700 ml


 


# Voids 8











Laboratory


Labs





Laboratory Tests








Test


 10/15/19


04:40


 


White Blood Count


 10.2 x10^3/uL


(4.0-11.0)


 


Red Blood Count


 3.76 x10^6/uL


(3.50-5.40)


 


Hemoglobin


 10.7 g/dL


(12.0-15.5)


 


Hematocrit


 32.5 %


(36.0-47.0)


 


Mean Corpuscular Volume 87 fL () 


 


Mean Corpuscular Hemoglobin 28 pg (25-35) 


 


Mean Corpuscular Hemoglobin


Concent 33 g/dL


(31-37)


 


Red Cell Distribution Width


 14.7 %


(11.5-14.5)


 


Platelet Count


 233 x10^3/uL


(140-400)


 


Neutrophils (%) (Auto) 69 % (31-73) 


 


Lymphocytes (%) (Auto) 16 % (24-48) 


 


Monocytes (%) (Auto) 11 % (0-9) 


 


Eosinophils (%) (Auto) 3 % (0-3) 


 


Basophils (%) (Auto) 1 % (0-3) 


 


Neutrophils # (Auto)


 7.1 x10^3/uL


(1.8-7.7)


 


Lymphocytes # (Auto)


 1.6 x10^3/uL


(1.0-4.8)


 


Monocytes # (Auto)


 1.1 x10^3/uL


(0.0-1.1)


 


Eosinophils # (Auto)


 0.3 x10^3/uL


(0.0-0.7)


 


Basophils # (Auto)


 0.1 x10^3/uL


(0.0-0.2)


 


Sodium Level


 144 mmol/L


(136-145)


 


Potassium Level


 4.0 mmol/L


(3.5-5.1)


 


Chloride Level


 108 mmol/L


()


 


Carbon Dioxide Level


 28 mmol/L


(21-32)


 


Anion Gap 8 (6-14) 


 


Blood Urea Nitrogen 7 mg/dL (7-20) 


 


Creatinine


 0.9 mg/dL


(0.6-1.0)


 


Estimated GFR


(Cockcroft-Gault) 65.2 





 


BUN/Creatinine Ratio 8 (6-20) 


 


Glucose Level


 113 mg/dL


(70-99)


 


Calcium Level


 8.9 mg/dL


(8.5-10.1)


 


Total Bilirubin


 0.2 mg/dL


(0.2-1.0)


 


Aspartate Amino Transf


(AST/SGOT) 29 U/L (15-37) 





 


Alanine Aminotransferase


(ALT/SGPT) 51 U/L (14-59) 





 


Alkaline Phosphatase


 59 U/L


()


 


Total Protein


 6.4 g/dL


(6.4-8.2)


 


Albumin


 2.4 g/dL


(3.4-5.0)


 


Albumin/Globulin Ratio 0.6 (1.0-1.7) 











Physical Exam


HEENT:  Neck Supple W Full Motion


Chest:  Symmetric


LUNGS:  Clear to Auscultation


Heart:  S1S2, RRR (SR, SB)


Abdomen:  Soft N/T


Extremities:  No Edema


Neurology:  alert, follow commands





Assessment


Assessment


1.  Acute hypoxic respiratory failure, multifactorial.


2.  NSTEMI; troponin trended downward. Cath showed probable mid to distal LAD 

spontaneous coronary artery dissection. 


3.  Acute on chronic diastolic/systolic CHF,  possible cor pulmonale. Appears 

compensated 


4.  CMP; Takatsubo versus SCAD of the LAD


5.  Hypertension; controlled  


6.  Sinus bradycardia. Few 1-2 second pauses noted yesterday afternoon. None 

further since 





Recommendations





Decrease metoprolol to 12.5 BID


Secondary prevention including DAPT with ASA, Plavix. BB therapy 


Continue HF optimization


May discharge from a CV standpoint and f/u in our office with Dr. Riddle as 

scheduled.





LETICIA RODRIGUEZ MD 10/16/19 0802:








WENDY MCKEON           Oct 15, 2019 13:53


LETICIA RODRIGUEZ MD       Oct 16, 2019 08:02

## 2019-10-15 NOTE — NUR
Attempted to call report to [facility], was sent to voicemail twice. 
voicemail left and spoke with  and left my name and number, she said she would let 
them know to call me.

## 2019-10-15 NOTE — NUR
SS following up with discharge planning. New discharge orders received for return to 
[facility], [phone]; fax [phone]. SS phoned and faxed discharge orders to 
[facility]. Pt's mother reporting that she will transport pt back to nursing home when 
ready. Per pt's RN, [facility] has not seen pt at this time. SS will continue to follow for 
discharge planning.

## 2019-10-15 NOTE — SNU/HH DC
DISCHARGE ORDERS


DISCHARGE INFORMATION:


DISCHARGE DATE:  Oct 15, 2019


FINAL DIAGNOSIS


Problems


Medical Problems:


(1) NSTEMI (non-ST elevated myocardial infarction)


Status: Acute  








CONDITION ON DISCHARGE:  Stable





CODE STATUS:


Code Status:  Full





POST DISCHARGE ORDERS:


ACTIVITY ORDERS:  No restrictions


DIET AFTER DISCHARGE:  Cardiac





CHECKS AFTER DISCHARGE:


CHECKS AFTER DISCHARGE:  Check blood press - daily





TREATMENT/EQUIPMENT ORDERS:


ADAPTIVE EQUIPMENT NEEDED:  None


Physical Therapy For:  Evalulation/Treatment


Occupational Therapy For:  Evaluation/Treatment


Speech Language Pathology For:  Evaluation/Treatment





DISCHARGE MEDICATIONS:


Home Meds


Active Scripts


Lisinopril (LISINOPRIL) 5 Mg Tablet, 5 MG PO DAILY for CAD for 30 Days, #30 TAB


   Prov:ADELA MCCANN MD         10/15/19


Atorvastatin Calcium (ATORVASTATIN CALCIUM) 40 Mg Tablet, 40 MG PO QHS for HLD 

for 30 Days, #30 TAB


   Prov:ADELA MCCANN MD         10/15/19


Clopidogrel Bisulfate (CLOPIDOGREL) 75 Mg Tablet, 75 MG PO DAILYWBKFT for CAD 

for 30 Days, #30 TAB


   Prov:ADELA MCCANN MD         10/15/19


Metoprolol Tartrate (METOPROLOL TARTRATE) 25 Mg Tablet, 0.5 TAB PO BID for CAD 

for 30 Days, #30 TAB 1 Refill


   Prov:ADELA MCCANN MD         10/15/19


Reported Medications


Lithium Carbonate (LITHIUM CARBONATE) 300 Mg Tablet.er, 300 MG PO BID, TAB.SR


   10/13/19


Diltiazem Hcl (CARDIZEM CD) 240 Mg Cap.er.24h, 1 CAP PO DAILY for heart failure,

#30 CAP 5 Refills


   10/12/19


Acetaminophen (TYLENOL) 325 Mg Tablet, 650 MG PO PRN Q6HRS PRN for PAIN for 30 

Days, TAB


   10/12/19


Ondansetron Hcl (ZOFRAN) 4 Mg Tablet, 1 TAB PO Q8HRS PRN for NAUSEA for 30 Days,

#90 TAB


   10/12/19


Polyethylene Glycol 3350 (MIRALAX) 17 Gm Powd.pack, 1 PACKET PO DAILY PRN for 

CONSTIPATION for 30 Days, #30 PACKET 0 Refills


   dissolve in water


   10/12/19


Loperamide Hcl (ANTI-DIARRHEAL) 2 Mg Capsule, 2 MG PO PRN Q1HR PRN for DIARRHEA 

for 30 Days, CAP


   10/12/19


Ibuprofen (IBUPROFEN) 400 Mg Tablet, 400 MG PO PRN Q6HRS PRN for INFLAMMATION 

for 30 Days, TAB


   10/12/19


Ipratropium/Albuterol Sulfate (DUONEB 0.5-3(2.5) MG/3 ML) 3 Ml Ampul.neb, 3 ML 

NEB QID PRN for SHORTNESS OF BREATH for 30 Days, #120 EACH


   10/12/19


Divalproex Sodium (DIVALPROEX SODIUM ER) 500 Mg Tab.er.24h, 750 MG PO QHS for 

Bipolar Disorder for 30 Days, #45 TAB.SR


   10/12/19


Levothyroxine Sodium (LEVOTHYROXINE SODIUM) 125 Mcg Tablet, 125 MCG PO DAILYAC 

for THYROID SUPPLEMENT, #30 TAB 0 Refills


   10/12/19


Oxybutynin Chloride (DITROPAN XL) 5 Mg Tab.er.24, 5 MG PO QHS, #30 TAB.SR


   10/12/19


Quetiapine Fumarate (SEROQUEL) 400 Mg Tablet, 1 TAB PO QHS, #30 TAB 1 Refill


   3/7/16


Paliperidone (INVEGA) 6 Mg Tab.er.24, 2 TAB PO DAILY, #30 TAB 2 Refills


   3/7/16


Mineral Oil/Petrolatum,White (EUCERIN CREME ***) 120 Gm Cream..g., 1 PAOLA TP HS 

PRN for SKIN BREAKDOWN, #1 TUBE


   3/7/16


Clonazepam (CLONAZEPAM) 2 Mg Tablet, 1 TAB PO BID, #90 TAB


   3/7/16


Zolpidem Tartrate (AMBIEN) 10 Mg Tablet, 10 MG PO QHS, TAB 0 Refills


   3/7/16


Discontinued Reported Medications


Lithium Carbonate (LITHIUM CARBONATE) 300 Mg Tablet, 1 TAB PO BID for manic 

episode w/psychosis for 30 Days, #60 TAB 1 Refill


   10/12/19


Diltiazem Hcl (CARDIZEM CD) 180 Mg Cap.er.24h, 1 CAP PO DAILY for heart failure,

#90 CAP 1 Refill


   10/12/19


Diltiazem Hcl (DILTIAZEM 24HR CD) 120 Mg Cap.er.24h, 1 CAP PO DAILY, #90 CAP 1 

Refill


   3/25/16


Guaifenesin (TUSSIN CHEST CONGESTION) 100 Mg/5 Ml Liquid, 100 MG PO PRN Q4HRS 

PRN for COUGH, LIQUID


   3/7/16


Quetiapine Fumarate (SEROQUEL) 300 Mg Tablet, 1 TAB PO QHS, #30 TAB 1 Refill


   3/7/16


Nicotine Polacrilex (NICOTINE GUM) 4 Mg Gum, 4 MG BC PRN PRN for SMOKING 

CESSATION


   3/7/16


Levothyroxine Sodium (LEVOTHYROXINE SODIUM) 100 Mcg Tablet, 1 TAB PO DAILY, #90 

TAB 1 Refill


   3/7/16











ADELA MCCANN MD        Oct 15, 2019 07:58

## 2019-10-15 NOTE — PDOC
PROGRESS NOTES


Chief Complaint


Chief Complaint


Chest pain


Acute hypoxic respiratory failure


Bipolar disorder


Anxiety


hypothyroidism


chronic diastolic CHF


hypertension


insomnia


urinary incontinence





History of Present Illness


History of Present Illness


Ms Yee is a 53yo F w/ PMHx bipolar disorder long term SNF resident who is 

admitted for shortness of breath found with an NSTEMI and went to cath lab 10/12

with likely coronary artery dissection vs stress induced cardiomyopathy found. 

Troponin peak 12.2


Pt was quite talkative today and seems to be at her baseline. Is awaiting an 

echocardiogram currently. No pain complaints. Mild shortness of breath


DW RN and pt's mother





Vitals


Vitals





Vital Signs








  Date Time  Temp Pulse Resp B/P (MAP) Pulse Ox O2 Delivery O2 Flow Rate FiO2


 


10/15/19 03:35     93 Nasal Cannula 2.0 


 


10/15/19 03:25 98.9 67 20 129/75 (93)    





 98.9       











Physical Exam


General:  Alert, No acute distress


Heart:  Regular rate


Lungs:  Clear, Other


Abdomen:  Normal bowel sounds


Extremities:  No clubbing, No cyanosis


Skin:  No rashes





Labs


LABS





Laboratory Tests








Test


 10/15/19


04:40


 


White Blood Count


 10.2 x10^3/uL


(4.0-11.0)


 


Red Blood Count


 3.76 x10^6/uL


(3.50-5.40)


 


Hemoglobin


 10.7 g/dL


(12.0-15.5)


 


Hematocrit


 32.5 %


(36.0-47.0)


 


Mean Corpuscular Volume 87 fL () 


 


Mean Corpuscular Hemoglobin 28 pg (25-35) 


 


Mean Corpuscular Hemoglobin


Concent 33 g/dL


(31-37)


 


Red Cell Distribution Width


 14.7 %


(11.5-14.5)


 


Platelet Count


 233 x10^3/uL


(140-400)


 


Neutrophils (%) (Auto) 69 % (31-73) 


 


Lymphocytes (%) (Auto) 16 % (24-48) 


 


Monocytes (%) (Auto) 11 % (0-9) 


 


Eosinophils (%) (Auto) 3 % (0-3) 


 


Basophils (%) (Auto) 1 % (0-3) 


 


Neutrophils # (Auto)


 7.1 x10^3/uL


(1.8-7.7)


 


Lymphocytes # (Auto)


 1.6 x10^3/uL


(1.0-4.8)


 


Monocytes # (Auto)


 1.1 x10^3/uL


(0.0-1.1)


 


Eosinophils # (Auto)


 0.3 x10^3/uL


(0.0-0.7)


 


Basophils # (Auto)


 0.1 x10^3/uL


(0.0-0.2)


 


Sodium Level


 144 mmol/L


(136-145)


 


Potassium Level


 4.0 mmol/L


(3.5-5.1)


 


Chloride Level


 108 mmol/L


()


 


Carbon Dioxide Level


 28 mmol/L


(21-32)


 


Anion Gap 8 (6-14) 


 


Blood Urea Nitrogen 7 mg/dL (7-20) 


 


Creatinine


 0.9 mg/dL


(0.6-1.0)


 


Estimated GFR


(Cockcroft-Gault) 65.2 





 


BUN/Creatinine Ratio 8 (6-20) 


 


Glucose Level


 113 mg/dL


(70-99)


 


Calcium Level


 8.9 mg/dL


(8.5-10.1)


 


Total Bilirubin


 0.2 mg/dL


(0.2-1.0)


 


Aspartate Amino Transf


(AST/SGOT) 29 U/L (15-37) 





 


Alanine Aminotransferase


(ALT/SGPT) 51 U/L (14-59) 





 


Alkaline Phosphatase


 59 U/L


()


 


Total Protein


 6.4 g/dL


(6.4-8.2)


 


Albumin


 2.4 g/dL


(3.4-5.0)


 


Albumin/Globulin Ratio 0.6 (1.0-1.7) 











Assessment and Plan


Assessmemt and Plan


Problems


Medical Problems:


(1) NSTEMI (non-ST elevated myocardial infarction)


Status: Acute  











Comment


Review of Relevant


I have reviewed the following items wanda (where applicable) has been applied.


Labs





Laboratory Tests








Test


 10/13/19


09:53 10/14/19


03:25 10/15/19


04:40


 


Heparin Anti-Xa Act,


Unfractionated < 0.10 IU/mL


(0.30-0.70) 


 





 


White Blood Count


 


 10.5 x10^3/uL


(4.0-11.0) 10.2 x10^3/uL


(4.0-11.0)


 


Red Blood Count


 


 3.95 x10^6/uL


(3.50-5.40) 3.76 x10^6/uL


(3.50-5.40)


 


Hemoglobin


 


 11.3 g/dL


(12.0-15.5) 10.7 g/dL


(12.0-15.5)


 


Hematocrit


 


 34.5 %


(36.0-47.0) 32.5 %


(36.0-47.0)


 


Mean Corpuscular Volume  88 fL ()  87 fL () 


 


Mean Corpuscular Hemoglobin  29 pg (25-35)  28 pg (25-35) 


 


Mean Corpuscular Hemoglobin


Concent 


 33 g/dL


(31-37) 33 g/dL


(31-37)


 


Red Cell Distribution Width


 


 14.6 %


(11.5-14.5) 14.7 %


(11.5-14.5)


 


Platelet Count


 


 219 x10^3/uL


(140-400) 233 x10^3/uL


(140-400)


 


Neutrophils (%) (Auto)  73 % (31-73)  69 % (31-73) 


 


Lymphocytes (%) (Auto)  15 % (24-48)  16 % (24-48) 


 


Monocytes (%) (Auto)  10 % (0-9)  11 % (0-9) 


 


Eosinophils (%) (Auto)  2 % (0-3)  3 % (0-3) 


 


Basophils (%) (Auto)  1 % (0-3)  1 % (0-3) 


 


Neutrophils # (Auto)


 


 7.7 x10^3/uL


(1.8-7.7) 7.1 x10^3/uL


(1.8-7.7)


 


Lymphocytes # (Auto)


 


 1.6 x10^3/uL


(1.0-4.8) 1.6 x10^3/uL


(1.0-4.8)


 


Monocytes # (Auto)


 


 1.0 x10^3/uL


(0.0-1.1) 1.1 x10^3/uL


(0.0-1.1)


 


Eosinophils # (Auto)


 


 0.2 x10^3/uL


(0.0-0.7) 0.3 x10^3/uL


(0.0-0.7)


 


Basophils # (Auto)


 


 0.0 x10^3/uL


(0.0-0.2) 0.1 x10^3/uL


(0.0-0.2)


 


Sodium Level


 


 142 mmol/L


(136-145) 144 mmol/L


(136-145)


 


Potassium Level


 


 3.3 mmol/L


(3.5-5.1) 4.0 mmol/L


(3.5-5.1)


 


Chloride Level


 


 104 mmol/L


() 108 mmol/L


()


 


Carbon Dioxide Level


 


 29 mmol/L


(21-32) 28 mmol/L


(21-32)


 


Anion Gap  9 (6-14)  8 (6-14) 


 


Blood Urea Nitrogen  9 mg/dL (7-20)  7 mg/dL (7-20) 


 


Creatinine


 


 1.0 mg/dL


(0.6-1.0) 0.9 mg/dL


(0.6-1.0)


 


Estimated GFR


(Cockcroft-Gault) 


 57.8 


 65.2 





 


BUN/Creatinine Ratio  9 (6-20)  8 (6-20) 


 


Glucose Level


 


 126 mg/dL


(70-99) 113 mg/dL


(70-99)


 


Calcium Level


 


 9.1 mg/dL


(8.5-10.1) 8.9 mg/dL


(8.5-10.1)


 


Magnesium Level


 


 2.2 mg/dL


(1.8-2.4) 





 


Total Bilirubin


 


 0.2 mg/dL


(0.2-1.0) 0.2 mg/dL


(0.2-1.0)


 


Aspartate Amino Transf


(AST/SGOT) 


 57 U/L (15-37) 


 29 U/L (15-37) 





 


Alanine Aminotransferase


(ALT/SGPT) 


 61 U/L (14-59) 


 51 U/L (14-59) 





 


Alkaline Phosphatase


 


 71 U/L


() 59 U/L


()


 


Total Protein


 


 7.1 g/dL


(6.4-8.2) 6.4 g/dL


(6.4-8.2)


 


Albumin


 


 2.7 g/dL


(3.4-5.0) 2.4 g/dL


(3.4-5.0)


 


Albumin/Globulin Ratio  0.6 (1.0-1.7)  0.6 (1.0-1.7) 








Laboratory Tests








Test


 10/15/19


04:40


 


White Blood Count


 10.2 x10^3/uL


(4.0-11.0)


 


Red Blood Count


 3.76 x10^6/uL


(3.50-5.40)


 


Hemoglobin


 10.7 g/dL


(12.0-15.5)


 


Hematocrit


 32.5 %


(36.0-47.0)


 


Mean Corpuscular Volume 87 fL () 


 


Mean Corpuscular Hemoglobin 28 pg (25-35) 


 


Mean Corpuscular Hemoglobin


Concent 33 g/dL


(31-37)


 


Red Cell Distribution Width


 14.7 %


(11.5-14.5)


 


Platelet Count


 233 x10^3/uL


(140-400)


 


Neutrophils (%) (Auto) 69 % (31-73) 


 


Lymphocytes (%) (Auto) 16 % (24-48) 


 


Monocytes (%) (Auto) 11 % (0-9) 


 


Eosinophils (%) (Auto) 3 % (0-3) 


 


Basophils (%) (Auto) 1 % (0-3) 


 


Neutrophils # (Auto)


 7.1 x10^3/uL


(1.8-7.7)


 


Lymphocytes # (Auto)


 1.6 x10^3/uL


(1.0-4.8)


 


Monocytes # (Auto)


 1.1 x10^3/uL


(0.0-1.1)


 


Eosinophils # (Auto)


 0.3 x10^3/uL


(0.0-0.7)


 


Basophils # (Auto)


 0.1 x10^3/uL


(0.0-0.2)


 


Sodium Level


 144 mmol/L


(136-145)


 


Potassium Level


 4.0 mmol/L


(3.5-5.1)


 


Chloride Level


 108 mmol/L


()


 


Carbon Dioxide Level


 28 mmol/L


(21-32)


 


Anion Gap 8 (6-14) 


 


Blood Urea Nitrogen 7 mg/dL (7-20) 


 


Creatinine


 0.9 mg/dL


(0.6-1.0)


 


Estimated GFR


(Cockcroft-Gault) 65.2 





 


BUN/Creatinine Ratio 8 (6-20) 


 


Glucose Level


 113 mg/dL


(70-99)


 


Calcium Level


 8.9 mg/dL


(8.5-10.1)


 


Total Bilirubin


 0.2 mg/dL


(0.2-1.0)


 


Aspartate Amino Transf


(AST/SGOT) 29 U/L (15-37) 





 


Alanine Aminotransferase


(ALT/SGPT) 51 U/L (14-59) 





 


Alkaline Phosphatase


 59 U/L


()


 


Total Protein


 6.4 g/dL


(6.4-8.2)


 


Albumin


 2.4 g/dL


(3.4-5.0)


 


Albumin/Globulin Ratio 0.6 (1.0-1.7) 








Medications





Current Medications


Heparin Sodium (Porcine) (Heparin Sodium) 4,000 unit 1X  ONCE IV ;  Start 

10/12/19 at 01:00;  Stop 10/12/19 at 01:01;  Status DC


Heparin Sodium/ Dextrose 500 ml @ 0 mls/hr CONT PRN  PRN IV ANTICOAGUALATION;  

Start 10/12/19 at 00:45;  Stop 10/12/19 at 01:58;  Status DC


Heparin Sodium (Porcine) (Heparin Sodium) 2,000 unit PRN Q6HRS  PRN IV FOR UFH 

LEVEL LESS THAN 0.2;  Start 10/12/19 at 00:45;  Stop 10/12/19 at 01:58;  Status 

DC


Atorvastatin Calcium (Lipitor) 40 mg QHS PO  Last administered on 10/14/19at 

20:52;  Start 10/12/19 at 21:00


Ondansetron HCl (Zofran) 4 mg PRN Q8HRS  PRN IV NAUSEA/VOMITING 1ST CHOICE;  

Start 10/12/19 at 01:45;  Stop 10/13/19 at 01:44;  Status DC


Morphine Sulfate (Morphine Sulfate) 2 mg PRN Q2HR  PRN IV SEVERE PAIN 7-10;  

Start 10/12/19 at 01:45;  Stop 10/13/19 at 01:44;  Status DC


Heparin Sodium/ Dextrose 500 ml @ 0 mls/hr CONT  PRN IV CARDIOVASCULAR PROTOCOL 

Last administered on 10/13/19at 10:40;  Start 10/12/19 at 02:00


Heparin Sodium (Porcine) (Heparin Sodium) 3,050 unit PRN Q6HRS  PRN IV FOR UFH 

LEVEL LESS THAN 0.2 Last administered on 10/13/19at 12:50;  Start 10/12/19 at 

02:00


Morphine Sulfate (Morphine Sulfate) 2 mg 1X  ONCE IV ;  Start 10/12/19 at 02:45;

 Stop 10/12/19 at 02:46;  Status DC


Influenza Virus Vaccine Quadrival (Afluria Quad 2019-20 (3yr Up) Syringe) 0.5 ml

ONCE ONCE VAX IM  Last administered on 10/12/19at 21:47;  Start 10/12/19 at 

09:00;  Stop 10/12/19 at 09:01;  Status DC


Sodium Chloride 1,000 ml @  75 mls/hr [id number] IV  Last administered on 

10/13/19at 23:12;  Start 10/12/19 at 11:00;  Stop 10/14/19 at 17:12;  Status DC


Metoprolol Tartrate (Lopressor) 25 mg BID PO  Last administered on 10/14/19at 

20:53;  Start 10/12/19 at 11:15


Aspirin (Ecotrin) 81 mg DAILYWBKFT PO  Last administered on 10/14/19at 09:48;  

Start 10/12/19 at 11:15


Iodixanol (Visipaque 320) 100 ml STK-MED ONCE .ROUTE ;  Start 10/12/19 at 12:06;

 Stop 10/12/19 at 12:06;  Status DC


Fentanyl Citrate (Fentanyl 2ml Vial) 100 mcg STK-MED ONCE .ROUTE ;  Start 

10/12/19 at 12:06;  Stop 10/12/19 at 12:06;  Status DC


Midazolam HCl (Versed) 2 mg STK-MED ONCE .ROUTE ;  Start 10/12/19 at 12:06;  

Stop 10/12/19 at 12:07;  Status DC


Heparin Sodium (Porcine) (Heparin Sodium) 10,000 unit STK-MED ONCE .ROUTE ;  

Start 10/12/19 at 12:06;  Stop 10/12/19 at 12:07;  Status DC


Verapamil HCl (Verapamil) 5 mg STK-MED ONCE .ROUTE ;  Start 10/12/19 at 12:06;  

Stop 10/12/19 at 12:07;  Status DC


Heparin Sodium/ Sodium Chloride 1,500 ml @  As Directed STK-MED ONCE .ROUTE ;  

Start 10/12/19 at 12:07;  Stop 10/12/19 at 12:07;  Status DC


Nitroglycerin (Nitroglycerin) 200 mcg STK-MED ONCE .ROUTE ;  Start 10/12/19 at 

12:07;  Stop 10/12/19 at 12:07;  Status DC


Nitroglycerin (Nitroglycerin) 200 mcg 1X  ONCE IART  Last administered on 

10/12/19at 12:45;  Start 10/12/19 at 12:45;  Stop 10/12/19 at 12:50;  Status DC


Verapamil HCl (Verapamil) 2.5 mg 1X  ONCE IART  Last administered on 10/12/19at 

12:45;  Start 10/12/19 at 12:45;  Stop 10/12/19 at 12:50;  Status DC


Heparin Sodium (Porcine) (Heparin Sodium) 2,500 unit 1X  ONCE IART  Last 

administered on 10/12/19at 12:45;  Start 10/12/19 at 12:45;  Stop 10/12/19 at 

12:50;  Status DC


Heparin Sodium/ Sodium Chloride (HEPARIN for ARTERIAL LINE FLUSH) 1,000 unit 1X 

ONCE IART  Last administered on 10/12/19at 12:45;  Start 10/12/19 at 12:45;  

Stop 10/12/19 at 12:50;  Status DC


Heparin Sodium/ Sodium Chloride (HEPARIN for ARTERIAL LINE FLUSH) 1,000 unit 1X 

ONCE IART  Last administered on 10/12/19at 12:45;  Start 10/12/19 at 12:45;  

Stop 10/12/19 at 12:50;  Status DC


Midazolam HCl (Versed) 2 mg 1X  ONCE IV  Last administered on 10/12/19at 12:45; 

Start 10/12/19 at 12:45;  Stop 10/12/19 at 12:50;  Status DC


Fentanyl Citrate (Fentanyl 2ml Vial) 100 mcg 1X  ONCE IV  Last administered on 

10/12/19at 12:45;  Start 10/12/19 at 12:45;  Stop 10/12/19 at 12:50;  Status DC


Iodixanol (Visipaque 320) 100 ml 1X  ONCE IART  Last administered on 10/12/19at 

12:45;  Start 10/12/19 at 12:45;  Stop 10/12/19 at 12:50;  Status DC


Lidocaine HCl (Xylocaine-Mpf 1% 2ml Vial) 2 ml 1X  ONCE INJ  Last administered 

on 10/12/19at 12:45;  Start 10/12/19 at 12:45;  Stop 10/12/19 at 12:50;  Status 

DC


Norepinephrine Bitartrate 250 ml @  22.875 mls/ hr CONT  PRN IV SEE I/O RECORD; 

Start 10/12/19 at 13:00;  Stop 10/13/19 at 16:31;  Status DC


Furosemide (Lasix) 40 mg 1X  ONCE IVP  Last administered on 10/13/19at 11:11;  

Start 10/13/19 at 11:00;  Stop 10/13/19 at 11:01;  Status DC


Clopidogrel Bisulfate (Plavix) 300 mg 1X  ONCE PO  Last administered on 

10/13/19at 11:11;  Start 10/13/19 at 11:00;  Stop 10/13/19 at 11:01;  Status DC


Clopidogrel Bisulfate (Plavix) 75 mg DAILYWBKFT PO  Last administered on 

10/14/19at 09:48;  Start 10/14/19 at 08:00


Acetaminophen (Tylenol) 325 mg PRN Q6HRS  PRN PO PAIN;  Start 10/13/19 at 12:15;

 Stop 10/13/19 at 16:30;  Status DC


Diltiazem HCl (Cardizem 24hr Cd) 240 mg DAILY PO  Last administered on 

10/14/19at 09:48;  Start 10/13/19 at 13:00


Divalproex Sodium (Depakote Er) 750 mg DAILY PO  Last administered on 10/14/19at

09:47;  Start 10/13/19 at 13:00


Ibuprofen (Motrin) 400 mg PRN Q6HRS  PRN PO INFLAMMATION;  Start 10/13/19 at 

12:15


Albuterol/ Ipratropium (Duoneb) 3 ml RTQID NEB  Last administered on 10/14/19at 

19:34;  Start 10/13/19 at 13:00


Levothyroxine Sodium (Synthroid) 125 mcg DAILY06 PO  Last administered on 

10/15/19at 05:07;  Start 10/13/19 at 13:00


Loperamide HCl (Imodium) 2 mg PRN Q3HRS  PRN PO DIARRHEA;  Start 10/13/19 at 

12:15


Metoprolol Tartrate (Lopressor) 25 mg BID PO ;  Start 10/13/19 at 13:00;  Status

UNV


Multi-Ingred Cream/Lotion/Oil/ Oint (Hydrocerin Cream) 1 alva HS TP  Last 

administered on 10/14/19at 20:53;  Start 10/13/19 at 21:00


Polyethylene Glycol (miraLAX PACKET) 17 gm DAILY PO ;  Start 10/13/19 at 13:00; 

Stop 10/13/19 at 16:33;  Status DC


Clonazepam (KlonoPIN) 2 mg BID PO  Last administered on 10/14/19at 20:52;  Start

10/13/19 at 21:00


Lithium Carbonate 300 mg BID PO  Last administered on 10/13/19at 13:37;  Start 

10/13/19 at 13:00;  Stop 10/13/19 at 16:30;  Status DC


Ondansetron HCl (Zofran Odt) 4 mg Q8HRS PO  Last administered on 10/13/19at 

13:37;  Start 10/13/19 at 14:00;  Stop 10/13/19 at 16:32;  Status DC


Oxybutynin Chloride (Ditropan) 2.5 mg PRN BID  PRN PO BLADDER SYMPTOMS;  Start 

10/13/19 at 14:00;  Stop 10/13/19 at 16:32;  Status DC


Non-Formulary Medication (Paliperidone (Invega)) 2 tab DAILY PO ;  Start 10/14/1

9 at 09:00;  Status UNV


Quetiapine Fumarate (SEROquel) 400 mg QHS PO  Last administered on 10/14/19at 

20:52;  Start 10/13/19 at 21:00


Zolpidem Tartrate (Ambien) 5 mg PRN QHS  PRN PO INSOMNIA;  Start 10/13/19 at 

12:30


Zolpidem Tartrate (Ambien) 5 mg QHS PO  Last administered on 10/14/19at 20:52;  

Start 10/13/19 at 21:00


Acetaminophen (Tylenol) 650 mg PRN Q6HRS  PRN PO PAIN;  Start 10/13/19 at 16:30


Lithium Carbonate (Lithobid) 300 mg BID PO  Last administered on 10/14/19at 

20:52;  Start 10/13/19 at 21:00


Oxybutynin Chloride (Ditropan) 2.5 mg BID PO  Last administered on 10/14/19at 

20:52;  Start 10/13/19 at 21:00


Ondansetron HCl (Zofran Odt) 4 mg PRN Q8HRS  PRN PO NAUSEA/VOMITING;  Start 

10/13/19 at 16:45


Polyethylene Glycol (miraLAX PACKET) 17 gm PRN DAILY  PRN PO CONSTIPATION;  

Start 10/13/19 at 16:45


Potassium Chloride (Klor-Con) 40 meq 1X  ONCE PO  Last administered on 

10/14/19at 12:58;  Start 10/14/19 at 12:45;  Stop 10/14/19 at 12:46;  Status DC


Lisinopril (Prinivil) 5 mg DAILY PO ;  Start 10/15/19 at 09:00





Active Scripts


Active


Reported


Lithium Carbonate 300 Mg Tablet.er 300 Mg PO BID


Cardizem Cd (Diltiazem Hcl) 240 Mg Cap.er.24h 1 Cap PO DAILY


Tylenol (Acetaminophen) 325 Mg Tablet 650 Mg PO PRN Q6HRS PRN 30 Days


Zofran (Ondansetron Hcl) 4 Mg Tablet 1 Tab PO Q8HRS PRN 30 Days


Miralax (Polyethylene Glycol 3350) 17 Gm Powd.pack 1 Packet PO DAILY PRN 30 Days


     dissolve in water


Anti-Diarrheal (Loperamide Hcl) 2 Mg Capsule 2 Mg PO PRN Q1HR PRN 30 Days


Ibuprofen 400 Mg Tablet 400 Mg PO PRN Q6HRS PRN 30 Days


Duoneb 0.5-3(2.5) Mg/3 Ml (Albuterol/Ipratropium) 3 Ml Ampul.neb 3 Ml NEB QID 

PRN 30 Days


Divalproex Sodium Er (Divalproex Sodium) 500 Mg Tab.er.24h 750 Mg PO QHS 30 Days


Levothyroxine Sodium 125 Mcg Tablet 125 Mcg PO DAILYAC


Ditropan Xl (Oxybutynin Chloride) 5 Mg Tab.er.24 5 Mg PO QHS


Metoprolol Tartrate 25 Mg Tablet 1 Tab PO BID


Seroquel (Quetiapine Fumarate) 400 Mg Tablet 1 Tab PO QHS


Invega (Paliperidone) 6 Mg Tab.er.24 2 Tab PO DAILY


Eucerin Creme *** (Mineral Oil/White Petrolatum) 120 Gm Cream..g. 1 Alva TP HS 

PRN


Clonazepam 2 Mg Tablet 1 Tab PO BID


Ambien (Zolpidem Tartrate) 10 Mg Tablet 10 Mg PO QHS


Vitals/I & O





Vital Sign - Last 24 Hours








 10/14/19 10/14/19 10/14/19 10/14/19





 08:00 08:28 09:48 09:48


 


Pulse   82 83


 


B/P (MAP)   123/70 123/70


 


Pulse Ox  93  


 


O2 Delivery Room Air Room Air  





 10/14/19 10/14/19 10/14/19 10/14/19





 10:37 12:15 14:39 16:38


 


Temp 98.2  97.8 





 98.2  97.8 


 


Pulse 72  66 


 


Resp 18  18 


 


B/P (MAP) 135/71 (92)  134/73 (93) 


 


Pulse Ox 89 91 91 91


 


O2 Delivery Room Air Room Air Room Air Room Air


 


    





    





 10/14/19 10/14/19 10/14/19 10/14/19





 19:20 19:35 19:50 19:53


 


Temp 99.4   





 99.4   


 


Pulse 69   


 


Resp 20   


 


B/P (MAP) 111/67 (82)   


 


Pulse Ox 86 92 93 


 


O2 Delivery Room Air Nasal Cannula Nasal Cannula Nasal Cannula


 


O2 Flow Rate  2.0 2.0 2.0


 


    





    





 10/14/19 10/14/19 10/15/19 10/15/19





 20:53 22:35 03:25 03:35


 


Temp  99.3 98.9 





  99.3 98.9 


 


Pulse 69 56 67 


 


Resp  18 20 


 


B/P (MAP) 111/67 125/67 (86) 129/75 (93) 


 


Pulse Ox  93 87 93


 


O2 Delivery  Nasal Cannula Room Air Nasal Cannula


 


O2 Flow Rate  2.0  2.0














Intake and Output   


 


 10/14/19 10/14/19 10/15/19





 15:00 23:00 07:00


 


Intake Total 720 ml 360 ml 860 ml


 


Output Total 600 ml  100 ml


 


Balance 120 ml 360 ml 760 ml

















ADELA MCCANN MD        Oct 15, 2019 07:59

## 2019-10-15 NOTE — CARD
MR#: [id number]

Account#: [id number]

Accession#: [id number]

Date of Study: 10/15/2019

Ordering Physician: WENDY MCKEON, 

Referring Physician: WENDY MCKEON, 

Tech: Ange Paredes





--------------- APPROVED REPORT --------------





EXAM: Two-dimensional and M-mode echocardiogram with Doppler and color Doppler.



Other Information 

Quality : AverageHR: 59bpm

Technically limited study due to  body habitus.



INDICATION

Non STEMI



2D DIMENSIONS 

RVDd2.8 (2.9-3.5cm)Left Atrium(2D)3.7 (1.6-4.0cm)

IVSd1.0 (0.7-1.1cm)Aortic Root(2D)2.8 (2.0-3.7cm)

LVDd5.1 (3.9-5.9cm)LVOT Diameter2.2 (1.8-2.4cm)

PWd1.0 (0.7-1.1cm)LVDs4.0 (2.5-4.0cm)

FS (%) 21.8 %SV55.1 ml

LVEF(%)43.9 (>50%)



Aortic Valve

AoV Peak Fracisco.139.4cm/sAoV VTI29.7cm

AO Peak GR.7.8mmHgLVOT  VTI 22.15cm

AO Mean GR.5mmHg



Mitral Valve

MV E Klhjhhcp595.3cm/sMV DECEL [id number]

MV A Uobbpckq55.6cm/sE/A  Ratio1.4



TDI

Lateral E' P. V7.47cm/sMedial E' P. V9.72cm/s

E/Lateral E'15.2E/Medial E'11.7



Tricuspid Valve

RAP [id number]



Pulmonary Vein

S1 Nmftadqf37.1cm/sS2 Pusacgko41.36cm/s

D2 Hboavley74.4cm/sPVa [id number]



 LEFT VENTRICLE 

The left ventricle is normal size. There is mild concentric left ventricular hypertrophy. The left ve
ntricular systolic function is normal and the ejection fraction is within normal range. The Ejection 
Fraction is 50-55%. The mid to distal septum is moderately hypokinetic. Transmitral Doppler flow alisa
indra is Grade II-pseudonormal filling dynamics.



 RIGHT VENTRICLE 

The right ventricle is normal size. There is normal right ventricular wall thickness. The right ventr
icular systolic function is normal.



 ATRIA 

The left atrium is borderline dilated. The right atrium is borderline dilated. The interatrial septum
 is intact with no evidence for an atrial septal defect or patent foramen ovale as noted on 2-D or Do
ppler imaging.



 AORTIC VALVE 

The aortic valve is normal in structure and function. Doppler and Color Flow revealed no significant 
aortic regurgitation. There is no significant aortic valvular stenosis.



 MITRAL VALVE 

The mitral valve is normal in structure and function. There is no evidence of mitral valve prolapse. 
There is no mitral valve stenosis. Doppler and Color Flow revealed no mitral valve regurgitation note
d.



 TRICUSPID VALVE 

The tricuspid valve is normal in structure and function. Doppler and Color Flow revealed trace tricus
pid regurgitation. There is no tricuspid valve prolapse or vegetation. There is no tricuspid valve st
enosis.



 PULMONIC VALVE 

The pulmonic valve is not well visualized. Doppler and Color Flow revealed no pulmonic valvular regur
gitation. There is no pulmonic valvular stenosis.



 GREAT VESSELS 

The aortic root is normal in size. The IVC is normal in size and collapses >50% with inspiration.



 PERICARDIAL EFFUSION 

There is no evidence of significant pericardial effusion.



Critical Notification

Critical Value: No



<Conclusion>

The left ventricular systolic function is normal and the ejection fraction is within normal range. Th
e Ejection Fraction is 50-55%.

The mid to distal septum is moderately hypokinetic.



Signed by : Amish Rodriguez, 

Electronically Approved : 10/15/2019 16:49:10

## 2019-10-17 NOTE — DS
DATE OF DISCHARGE:  10/15/2019



ADMISSION DIAGNOSIS:  Acute myocardial infarction.



DISCHARGE DIAGNOSES:  Resolving acute myocardial infarction with cardiac

catheterization, but no stent was placed (she had a max troponin of 12.2, but no

severe coronary artery disease was noted).



HOSPITAL COURSE:  The patient is a pleasant 54-year-old female who presented

with chest pain.  She was admitted and her troponin did bump up to 12.  She was

taken to the catheterization laboratory, but it was surprisingly negative.  She

was discharged to home on medical management.



DISPOSITION:  Home with medical management.



ACTIVITY:  As tolerated.



DIET:  Low sodium.



MEDICATIONS:  Please see MRAD.



TOTAL TIME:  31 minutes.

 



______________________________

MARCIAL DAVIS DO



DR:  ED/ricky  JOB#:  [id number] / [id number]

DD:  10/17/2019 14:56  DT:  10/17/2019 15:24